# Patient Record
Sex: FEMALE | Race: WHITE
[De-identification: names, ages, dates, MRNs, and addresses within clinical notes are randomized per-mention and may not be internally consistent; named-entity substitution may affect disease eponyms.]

---

## 2017-02-21 ENCOUNTER — HOSPITAL ENCOUNTER (EMERGENCY)
Dept: HOSPITAL 74 - JER | Age: 82
Discharge: HOME | End: 2017-02-21
Payer: COMMERCIAL

## 2017-02-21 VITALS — BODY MASS INDEX: 30.1 KG/M2

## 2017-02-21 VITALS — HEART RATE: 75 BPM | SYSTOLIC BLOOD PRESSURE: 155 MMHG | DIASTOLIC BLOOD PRESSURE: 74 MMHG

## 2017-02-21 VITALS — TEMPERATURE: 97.8 F

## 2017-02-21 DIAGNOSIS — E78.00: ICD-10-CM

## 2017-02-21 DIAGNOSIS — J40: Primary | ICD-10-CM

## 2017-02-21 DIAGNOSIS — I10: ICD-10-CM

## 2017-02-21 DIAGNOSIS — K21.9: ICD-10-CM

## 2017-02-21 DIAGNOSIS — F41.9: ICD-10-CM

## 2017-02-21 LAB
ALBUMIN SERPL-MCNC: 3.9 G/DL (ref 3.4–5)
ALP SERPL-CCNC: 70 U/L (ref 45–117)
ALT SERPL-CCNC: 19 U/L (ref 12–78)
ANION GAP SERPL CALC-SCNC: 7 MMOL/L (ref 8–16)
AST SERPL-CCNC: 11 U/L (ref 15–37)
BASOPHILS # BLD: 0.4 % (ref 0–2)
BILIRUB SERPL-MCNC: 0.5 MG/DL (ref 0.2–1)
CALCIUM SERPL-MCNC: 9.6 MG/DL (ref 8.5–10.1)
CO2 SERPL-SCNC: 30 MMOL/L (ref 21–32)
CREAT SERPL-MCNC: 0.9 MG/DL (ref 0.55–1.02)
DEPRECATED RDW RBC AUTO: 14.5 % (ref 11.6–15.6)
EOSINOPHIL # BLD: 2.5 % (ref 0–4.5)
GLUCOSE SERPL-MCNC: 113 MG/DL (ref 74–106)
MCH RBC QN AUTO: 29 PG (ref 25.7–33.7)
MCHC RBC AUTO-ENTMCNC: 32.9 G/DL (ref 32–36)
MCV RBC: 88.1 FL (ref 80–96)
NEUTROPHILS # BLD: 81.6 % (ref 42.8–82.8)
PLATELET # BLD AUTO: 235 K/MM3 (ref 134–434)
PMV BLD: 8.2 FL (ref 7.5–11.1)
PROT SERPL-MCNC: 7.4 G/DL (ref 6.4–8.2)
TROPONIN I SERPL-MCNC: < 0.02 NG/ML (ref 0–0.05)
WBC # BLD AUTO: 11 K/MM3 (ref 4–10)

## 2017-02-21 NOTE — PDOC
History of Present Illness





- General


History Source: Patient, Old Records


Exam Limitations: No Limitations





- History of Present Illness


Initial Comments: 





02/21/17 10:15


The patient is a 84 year old female, with a significant past medical history of 

GERD, HTN, HLD, colon CA s/p removal of tumor and anxiety, who presents to the 

emergency department with shortness of breath since yesterday and a dry 

intermittent cough for a few weeks. She denies shortness of breath on exertion 

or orthopnea. She notes that she does have chest discomfort which she describes 

as a heaviness. She notes that she had cold symptoms last month. She denies any 

sick contacts or recent travel. She notes that she received a flu shot and a 

pneumonia shot this year. 





The patient denies headache and dizziness. Denies fever, chills, nausea, vomit, 

diarrhea and constipation. Denies dysuria, frequency, urgency and hematuria. 





Allergies: Milk, wheat, clams, egg whites, shrimp 


Past surgical history: TUMOR REMOVED FROM COLON


Social history: No alcohol, tobacco or drug use reported 


PMD - Dr. Renee Cardona 





<He Nathan - Last Filed: 02/21/17 11:15>





- General


History Source: Patient


Exam Limitations: No Limitations





<Karen Mcguire - Last Filed: 02/21/17 13:27>





- General


Chief Complaint: Shortness of Breath


Stated Complaint: COUGH, COLD


Time Seen by Provider: 02/21/17 10:03





Past History





<He Nathan - Last Filed: 02/21/17 11:15>





- Past Medical History


GI Disorders: Yes (gerd)


HTN: Yes


Hypercholesterolemia: Yes


Psychiatric Problems: Yes (anxeity)





- Surgical History


Abdominal Surgery: Yes (TUMOR REMOVED FROM COLON)





- Immunization History


Immunization Up to Date: Yes





- Psycho/Social/Smoking Cessation Hx


Anxiety: Yes


Suicidal Ideation: No


Smoking History: Never smoked


Have you smoked in the past 12 months: No


Information on smoking cessation initiated: No


Hx Alcohol Use: No


Drug/Substance Use Hx: No


Substance Use Type: Alcohol





<Karen Mcguire - Last Filed: 02/21/17 13:27>





- Past Medical History


Allergies/Adverse Reactions: 


 Allergies











Allergy/AdvReac Type Severity Reaction Status Date / Time


 


milk Allergy   Verified 02/21/17 09:47


 


wheat Allergy   Verified 02/21/17 09:47


 


CLAMS Allergy   Uncoded 02/21/17 09:47


 


EGG WHITES Allergy   Uncoded 02/21/17 09:47


 


SHRIMP Allergy   Uncoded 02/21/17 09:47











Home Medications: 


Ambulatory Orders





Amitriptyline HCl [Elavil -] 25 mg PO DAILY 01/20/14 


Atenolol [Tenormin -] 50 mg PO DAILY 01/20/14 


Losartan Potassium 50 mg PO DAILY 01/20/14 


Omeprazole [Prilosec (RX)] 20 mg PO DAILY 01/20/14 


Cholecalciferol (Vitamin D3) [Vitamin D] 1,000 unit PO DAILY 02/20/16 


Simvastatin [Zocor -] 20 mg PO HS 02/20/16 


Albuterol 0.083% Nebulizer Sol [Ventolin 0.083% Nebulizer Soln -] 1 neb NEB Q6H 

PRN #30 vial 02/21/17 


Azithromycin [Zithromax 250mg Tablets -] 250 mg PO UTDICT #6 tab 02/21/17 


Guaifenesin [Mucinex] 200 mg PO Q6H PRN #30 gran.pack 02/21/17 


Nebulizer/Compressor [Absecon Choice Nebulizer] 1 each MC QID PRN #1  02/21/17 


Nebulizer/Compressor [Comp-Air Elite Comp Nebulizer] 1 each MC ASDIR PRN #1 

each 02/21/17 











**Review of Systems





- Review of Systems


Able to Perform ROS?: Yes


Comments:: 





02/21/17 10:15


GENERAL/CONSTITUTIONAL: No fever or chills. No weakness.


HEAD, EYES, EARS, NOSE AND THROAT: No change in vision. No ear pain or 

discharge. No sore throat.


CARDIOVASCULAR: +Chest discomfort and shortness of breath


RESPIRATORY: No cough, wheezing, or hemoptysis.


GASTROINTESTINAL: No nausea, vomiting, diarrhea or constipation.


GENITOURINARY: No dysuria, frequency, or change in urination.


MUSCULOSKELETAL: No joint or muscle swelling or pain. No neck or back pain.


SKIN: No rash


NEUROLOGIC: No headache, vertigo, loss of consciousness, or change in strength/

sensation.


ENDOCRINE: No increased thirst. No abnormal weight change


HEMATOLOGIC/LYMPHATIC: No anemia, easy bleeding, or history of blood clots.


ALLERGIC/IMMUNOLOGIC: No hives or skin allergy.








<He Nathan Last Filed: 02/21/17 11:15>





*Physical Exam





- Vital Signs


 Last Vital Signs











Temp Pulse Resp BP Pulse Ox


 


 97.8 F   78   18   168/83   100 


 


 02/21/17 09:48  02/21/17 09:48  02/21/17 09:48  02/21/17 09:48  02/21/17 09:48














- Physical Exam


Comments: 





02/21/17 10:15


GENERAL: Awake, alert, and fully oriented, in no acute distress


HEAD: No signs of trauma, normocephalic, atraumatic 


EYES: PERRLA, EOMI, sclera anicteric, conjunctiva clear


ENT: Auricles normal inspection, hearing grossly normal, nares patent, 

oropharynx clear without


exudates. Moist mucosa


NECK: Normal ROM, supple, no lymphadenopathy, JVD, or masses


LUNGS: No distress, speaks full sentences, clear to auscultation bilaterally 


HEART: Regular rate and rhythm, normal S1 and S2, no murmurs, rubs or gallops, 

peripheral pulses normal and equal bilaterally. 


ABDOMEN: Soft, nontender, normoactive bowel sounds.  No guarding, no rebound.  

No masses


EXTREMITIES: Normal inspection, Normal range of motion, no edema.  No clubbing 

or cyanosis. 


NEUROLOGICAL: Cranial nerves II through XII grossly intact.  Normal speech, 

normal gait, no focal sensorimotor deficits 


SKIN: Warm, Dry, normal turgor, no rashes or lesions noted. 











<YoannajuanaHe - Last Filed: 02/21/17 11:15>





- Vital Signs


 Last Vital Signs











Temp Pulse Resp BP Pulse Ox


 


 97.8 F   78   18   168/83   100 


 


 02/21/17 09:48  02/21/17 09:48  02/21/17 09:48  02/21/17 09:48  02/21/17 09:48














<Karen Mcguire - Last Filed: 02/21/17 13:27>





ED Treatment Course





- LABORATORY


CBC & Chemistry Diagram: 


 02/21/17 10:39





 02/21/17 10:39





- RADIOLOGY


Radiograph Interpretation: 





02/21/17 11:15


Chest X-Ray


Reviewed by: Dr. Rios Honeycutt


Impression: No acute pathology. No significant change since 8/16/2011





<He Nathan - Last Filed: 02/21/17 11:15>





- LABORATORY


CBC & Chemistry Diagram: 


 02/21/17 10:39





 02/21/17 10:39





<Karen Mcguire - Last Filed: 02/21/17 13:27>





Medical Decision Making





- Medical Decision Making


02/21/17 10:07


A portion of this note was documented by scribe services under my direction. I 

have reviewed the details of the note, within reason, and agree with the 

documentation with the following case summary and management plan written by me.


Nursing documentation reviewed and incorporated into medical decision making





This is an 84-year-old female who is relatively healthy and presents emergency 

department with a complaint of upper respiratory infection.


She status post influenza and pneumococcal vaccinations.


Patient states she had an upper respiratory infection approximately 4 weeks ago.


On February 3 again she developed upper respiratory infection.


She has not followed up with her primary care physician.


She has not taken any antibiotics.


Patient states she again had these symptoms and was concerned because she has a 

cough, feels chest congestion


No fever.


No chills





02/21/17 11:51


 Laboratory Tests











  02/21/17 02/21/17





  10:39 10:39


 


WBC  11.0 H D 


 


Hgb  13.9 


 


Hct  42.2 


 


Plt Count  235 


 


Neutrophils %  81.6  D 


 


Lymphocytes %  11.2  D 


 


BUN   12  D


 


Creatinine   0.9  D











CXR negative 








02/21/17 13:13


 Laboratory Tests











  02/21/17





  10:39


 


Creatine Kinase  35


 


Troponin I  < 0.02











Patient seen in the ER by Dr. Cardona.


Plan Will be for discharge on azithromycin, Mucinex


Patient can also take albuterol if she finds is helpful.


Patient was asked to follow up with Dr. Cardona next week.


I've asked patient to take her temperature, return to the ER for any other 

concerns or complaints.








I discussed the physical exam findings, ancillary test results and final 

diagnoses with the patient. I answered all of the patient's questions. The 

patient was satisfied with the care received and felt comfortable with the 

discharge plan and treatment plan. The patient will call their primary care 

physician within 24 hours to arrange follow-up and will return to the Emergency 

Department with any new, persistent or worsening symptoms. 





<Karen Mcguire - Last Filed: 02/21/17 13:27>





*DC/Admit/Observation/Transfer





- Attestations


Scribe Attestion: 





02/21/17 10:15


Documentation prepared by He Nathan, acting as medical scribe for 

Karen Mcguire MD.





<He Nathan - Last Filed: 02/21/17 11:15>





- Discharge Dispostion


Admit: No





<Karen Mcguire - Last Filed: 02/21/17 13:27>


Diagnosis at time of Disposition: 


 Bronchitis





- Discharge Dispostion


Disposition: HOME


Condition at time of disposition: Improved





- Prescriptions


Prescriptions: 


Nebulizer/Compressor [Absecon Choice Nebulizer] 1 each MC QID PRN #1 


 PRN Reason: Wheezing


Nebulizer/Compressor [Comp-Air Elite Comp Nebulizer] 1 each MC ASDIR PRN #1 each


 PRN Reason: congestion


Guaifenesin [Mucinex] 200 mg PO Q6H PRN #30 gran.pack


 PRN Reason: cough and congestion


Albuterol 0.083% Nebulizer Sol [Ventolin 0.083% Nebulizer Soln -] 1 neb NEB Q6H 

PRN #30 vial


 PRN Reason: Wheezing


Azithromycin [Zithromax 250mg Tablets -] 250 mg PO UTDICT #6 tab





- Referrals


Referrals: 


Renee Arteaga MD [Primary Care Provider] - 





- Patient Instructions


Printed Discharge Instructions:  DI for Acute Bronchitis, DI for Viral Upper 

Respiratory Infection -- Adult


Additional Instructions: 


Danielle





Thank you for coming in to the ER 


Please monitor yourself for fevers and chills


If you are short of breath you must return to the ER


Please please remember to follow up with Dr Arteaga next week

## 2017-02-23 ENCOUNTER — HOSPITAL ENCOUNTER (EMERGENCY)
Dept: HOSPITAL 74 - JER | Age: 82
Discharge: HOME | End: 2017-02-23
Payer: COMMERCIAL

## 2017-02-23 VITALS — HEART RATE: 68 BPM | TEMPERATURE: 97.4 F

## 2017-02-23 VITALS — SYSTOLIC BLOOD PRESSURE: 159 MMHG | DIASTOLIC BLOOD PRESSURE: 73 MMHG

## 2017-02-23 VITALS — BODY MASS INDEX: 28.8 KG/M2

## 2017-02-23 DIAGNOSIS — F41.9: ICD-10-CM

## 2017-02-23 DIAGNOSIS — I10: ICD-10-CM

## 2017-02-23 DIAGNOSIS — N39.0: Primary | ICD-10-CM

## 2017-02-23 DIAGNOSIS — K21.9: ICD-10-CM

## 2017-02-23 DIAGNOSIS — Z85.038: ICD-10-CM

## 2017-02-23 DIAGNOSIS — E78.00: ICD-10-CM

## 2017-02-23 LAB
ALBUMIN SERPL-MCNC: 3.4 G/DL (ref 3.4–5)
ALP SERPL-CCNC: 62 U/L (ref 45–117)
ALT SERPL-CCNC: 17 U/L (ref 12–78)
ANION GAP SERPL CALC-SCNC: 10 MMOL/L (ref 8–16)
APPEARANCE UR: (no result)
AST SERPL-CCNC: 35 U/L (ref 15–37)
BACTERIA #/AREA URNS HPF: (no result) /HPF
BASOPHILS # BLD: 0.5 % (ref 0–2)
BILIRUB SERPL-MCNC: 0.5 MG/DL (ref 0.2–1)
BILIRUB UR STRIP.AUTO-MCNC: NEGATIVE MG/DL
CALCIUM SERPL-MCNC: 9 MG/DL (ref 8.5–10.1)
CO2 SERPL-SCNC: 27 MMOL/L (ref 21–32)
COLOR UR: YELLOW
CREAT SERPL-MCNC: 0.7 MG/DL (ref 0.55–1.02)
DEPRECATED RDW RBC AUTO: 14.3 % (ref 11.6–15.6)
EOSINOPHIL # BLD: 3.2 % (ref 0–4.5)
GLUCOSE SERPL-MCNC: 100 MG/DL (ref 74–106)
INR BLD: 1.04 (ref 0.82–1.09)
KETONES UR QL STRIP: NEGATIVE
LEUKOCYTE ESTERASE UR QL STRIP.AUTO: (no result)
MCH RBC QN AUTO: 29 PG (ref 25.7–33.7)
MCHC RBC AUTO-ENTMCNC: 33.2 G/DL (ref 32–36)
MCV RBC: 87.5 FL (ref 80–96)
NEUTROPHILS # BLD: 76 % (ref 42.8–82.8)
NITRITE UR QL STRIP: NEGATIVE
PH UR: 6 [PH] (ref 5–8)
PLATELET # BLD AUTO: 232 K/MM3 (ref 134–434)
PMV BLD: 8.8 FL (ref 7.5–11.1)
PROT SERPL-MCNC: 7 G/DL (ref 6.4–8.2)
PROT UR QL STRIP: NEGATIVE
PROT UR QL STRIP: NEGATIVE
PT PNL PPP: 11.5 SEC (ref 9.98–11.88)
RBC # BLD AUTO: 2 /HPF (ref 0–3)
RBC # UR STRIP: NEGATIVE /UL
SP GR UR: 1.01 (ref 1–1.03)
UROBILINOGEN UR STRIP-MCNC: NEGATIVE E.U./DL (ref 0.2–1)
WBC # BLD AUTO: 9.2 K/MM3 (ref 4–10)
WBC # UR AUTO: 61 /HPF (ref 3–5)

## 2017-02-23 PROCEDURE — 3E033GC INTRODUCTION OF OTHER THERAPEUTIC SUBSTANCE INTO PERIPHERAL VEIN, PERCUTANEOUS APPROACH: ICD-10-PCS

## 2017-02-23 NOTE — EKG
Test Reason : 

Blood Pressure : ***/*** mmHG

Vent. Rate : 064 BPM     Atrial Rate : 064 BPM

   P-R Int : 210 ms          QRS Dur : 086 ms

    QT Int : 410 ms       P-R-T Axes : 016 -11 -01 degrees

   QTc Int : 422 ms

 

SINUS RHYTHM WITH 1ST DEGREE A-V BLOCK

POSSIBLE ANTERIOR INFARCT (CITED ON OR BEFORE 30-AUG-2011)

ABNORMAL ECG

WHEN COMPARED WITH ECG OF 30-AUG-2011 14:08,

NO SIGNIFICANT CHANGE WAS FOUND

Confirmed by JAYE ADAM MD ([id number]) on 2/23/2017 2:45:08 PM

 

Referred By:             Confirmed By:JAYE ADAM MD

## 2017-02-23 NOTE — PDOC
History of Present Illness





<Federico Hall - Last Filed: 02/23/17 16:31>





- History of Present Illness


Initial Comments: 


02/23/17 16:35


Patient is an 84 year old female with significant medical hx of GERD, HTN, HLD, 

colon CA s/p removal of tumor and anxiety who is presenting to the ED with 

diarrhea and lightheaded since yesterday. Patient was seen in the ED on 02/21/ 17 for cough and discharged on azithromycin and mucinex. Since starting 

antibiotic treatment that day, the patient has had multiple episodes of 

diarrhea and one episode of vomiting. The patient is associating her symptoms 

with her antibiotic treatment; she states that shes never taken azithromycin 

before. 


Patient notes that her cough has been improving since starting medication.


Denies fever, chills, abdominal pain, blood in stool. 





Allergies: Milk, wheat, clams, egg whites, shrimp 


Surgical Hx: colon tumor removal


Social Hx: Denies alcohol, tobacco or drug use 


PMD: Renee Arteaga MD








<Anastasia Jacobsen - Last Filed: 02/23/17 16:39>





- General


Chief Complaint: Lightheaded


Stated Complaint: DIZZINESS


Time Seen by Provider: 02/23/17 13:02





Past History





- Past Medical History


GI Disorders: Yes (gerd)


HTN: Yes


Hypercholesterolemia: Yes


Psychiatric Problems: Yes (anxeity)





- Surgical History


Abdominal Surgery: Yes (TUMOR REMOVED FROM COLON)





- Immunization History


Immunization Up to Date: Yes





- Psycho/Social/Smoking Cessation Hx


Anxiety: Yes


Suicidal Ideation: No


Smoking History: Never smoked


Have you smoked in the past 12 months: No


Hx Alcohol Use: No


Drug/Substance Use Hx: No


Substance Use Type: Alcohol





<Federico Hall - Last Filed: 02/23/17 16:31>





<Anastasia Jacobsen - Last Filed: 02/23/17 16:39>





- Past Medical History


Allergies/Adverse Reactions: 


 Allergies











Allergy/AdvReac Type Severity Reaction Status Date / Time


 


milk Allergy   Verified 02/23/17 13:06


 


wheat Allergy   Verified 02/23/17 13:06


 


CLAMS Allergy   Uncoded 02/23/17 13:06


 


EGG WHITES Allergy   Uncoded 02/23/17 13:06


 


SHRIMP Allergy   Uncoded 02/23/17 13:06











Home Medications: 


Ambulatory Orders





Amitriptyline HCl [Elavil -] 25 mg PO DAILY 01/20/14 


Atenolol [Tenormin -] 50 mg PO DAILY 01/20/14 


Losartan Potassium 50 mg PO DAILY 01/20/14 


Omeprazole [Prilosec (RX)] 20 mg PO DAILY 01/20/14 


Cholecalciferol (Vitamin D3) [Vitamin D] 1,000 unit PO DAILY 02/20/16 


Simvastatin [Zocor -] 20 mg PO HS 02/20/16 


Albuterol 0.083% Nebulizer Sol [Ventolin 0.083% Nebulizer Soln -] 1 neb NEB Q6H 

PRN #30 vial 02/21/17 


Azithromycin [Zithromax 250mg Tablets -] 250 mg PO UTDICT #6 tab 02/21/17 


Guaifenesin [Mucinex] 200 mg PO Q6H PRN #30 gran.pack 02/21/17 


Nebulizer/Compressor [Comp-Air Elite Comp Nebulizer] 1 each MC ASDIR PRN #1 

each 02/21/17 


Cephalexin Monohydrate [Keflex -] 500 mg PO Q6H #40 capsule 02/23/17 


Lactobacillus Rhamnosus GG [Culturelle] 1 each PO BID #60 capsule 02/23/17 











**Review of Systems





- Review of Systems


Comments:: 


02/23/17 16:36


GENERAL/CONSTITUTIONAL: No fever or chills. No weakness.


HEAD, EYES, EARS, NOSE AND THROAT: No change in vision. No ear pain or 

discharge. No sore throat.


CARDIOVASCULAR: Lightheadedness. No chest pain or shortness of breath.


RESPIRATORY: Cough. No wheezing or hemoptysis.


GASTROINTESTINAL: Vomiting, diarrhea. No abdominal pain, diarrhea or 

constipation.


GENITOURINARY: No dysuria, frequency, or change in urination.


MUSCULOSKELETAL: No joint or muscle swelling or pain. No neck or back pain.


SKIN: No rash


NEUROLOGIC: No headache, vertigo, loss of consciousness, or change in strength/

sensation.








<Anastasia Jacobsen - Last Filed: 02/23/17 16:39>





*Physical Exam





- Vital Signs


 Last Vital Signs











Temp Pulse Resp BP Pulse Ox


 


 97.4 F L  68   20   159/73   99 


 


 02/23/17 13:07  02/23/17 14:31  02/23/17 13:07  02/23/17 14:31  02/23/17 13:07














- Physical Exam


Comments: 


02/23/17 16:37


GENERAL: Awake, alert, and fully oriented, in no acute distress


HEAD: No signs of trauma


EYES: PERRLA, EOMI, sclera anicteric, conjunctiva clear


ENT: Auricles normal inspection, hearing grossly normal, nares patent, 

oropharynx clear without 


exudates. Moist mucosa


NECK: Normal ROM, supple, no lymphadenopathy, JVD, or masses


LUNGS: Breath sounds equal, clear to auscultation bilaterally.  No wheezes, and 

no crackles


HEART: Regular rate and rhythm, normal S1 and S2, no murmurs, rubs or gallops


ABDOMEN: Soft, nontender, normoactive bowel sounds.  No guarding, no rebound.  

No masses


EXTREMITIES: Normal range of motion, no edema.  No clubbing or cyanosis. No 

cords, erythema, or tenderness


NEUROLOGICAL: Cranial nerves II through XII grossly intact.  Normal speech, 

normal gait


SKIN: Warm, Dry, normal turgor, no rashes or lesions noted.


ENDOCRINE: No increased thirst. No abnormal weight change.


HEMATOLOGIC/LYMPHATIC: No anemia, easy bleeding, or history of blood clots.


ALLERGIC/IMMUNOLOGIC: No hives or skin allergy.








<Anastasia Jacobsen - Last Filed: 02/23/17 16:39>





ED Treatment Course





- LABORATORY


CBC & Chemistry Diagram: 


 02/23/17 13:42





 02/23/17 13:42





<Federico Hall - Last Filed: 02/23/17 16:31>





- LABORATORY


CBC & Chemistry Diagram: 


 02/23/17 13:42





 02/23/17 13:42





- ADDITIONAL ORDERS


Additional order review: 


 Laboratory  Results











  02/23/17 02/23/17 02/23/17





  13:42 13:42 13:42


 


INR    1.04


 


Sodium  136  


 


Potassium  4.5  


 


Chloride  99  


 


Carbon Dioxide  27  


 


Anion Gap  10  


 


BUN  16  D  


 


Creatinine  0.7  D  


 


Creat Clearance w eGFR  > 60  


 


Random Glucose  100  


 


Calcium  9.0  


 


Total Bilirubin  0.5  


 


AST  35  D  


 


ALT  17  


 


Alkaline Phosphatase  62  


 


Total Protein  7.0  


 


Albumin  3.4  


 


Lipase  85  


 


Urine Color   Yellow 


 


Urine Appearance   Cloudy 


 


Urine pH   6.0 


 


Ur Specific Gravity   1.008 


 


Urine Protein   Negative 


 


Urine Glucose (UA)   Negative 


 


Urine Ketones   Negative 


 


Urine Blood   Negative 


 


Urine Nitrite   Negative 


 


Urine Bilirubin   Negative 


 


Urine Urobilinogen   Negative 


 


Ur Leukocyte Esterase   3+ H D 


 


Urine RBC   2 


 


Urine WBC   61 


 


Ur Epithelial Cells   Rare 


 


Urine Bacteria   Many 




















 02/23/17 14:30 Clostridium difficile Antigen (FELIPE) - Final





 Stool Clostridium difficile Toxin Assay - Final








 











  02/23/17





  13:42


 


RBC  4.60


 


MCV  87.5


 


MCHC  33.2


 


RDW  14.3


 


MPV  8.8


 


Neutrophils %  76.0


 


Lymphocytes %  14.7  D


 


Monocytes %  5.6


 


Eosinophils %  3.2


 


Basophils %  0.5














- RADIOLOGY


Radiograph Interpretation: 


02/23/17 16:38


Head CT


Impression: Moderate atrophy. 


No gross evidence of a focal intracranial lesion or hemorrhage is seen.


Reported By: Lucas Linda MD





- Medications


Given in the ED: 


ED Medications














Discontinued Medications














Generic Name Dose Route Start Last Admin





  Trade Name Freq  PRN Reason Stop Dose Admin


 


Sodium Chloride  1,000 mls @ 1,000 mls/hr 02/23/17 13:38 02/23/17 14:10





  Normal Saline -  IV 02/23/17 14:37  1,000 mls/hr





  ASDIR STA   Administration


 


Ondansetron HCl  4 mg 02/23/17 13:38 02/23/17 14:10





  Zofran Injection  IVPUSH 02/23/17 13:39  4 mg





  ONCE ONE   Administration














<Anastasia Jacobsen - Last Filed: 02/23/17 16:39>





*DC/Admit/Observation/Transfer





- Discharge Dispostion


Admit: No





- Attestations


Physician Attestion: 





02/23/17 13:03








I, Dr. Federico Hall, attest that this document has been prepared under my 

direction and personally reviewed by me in its entirety.   I further attest, 

that it accurately reflects all work, treatment, procedures and medical decision

-making performed by me.  





<Federico Hall - Last Filed: 02/23/17 16:31>





- Attestations


Scribe Attestion: 


02/23/17 16:39


Documentation prepared by Anastasia Jacobsen, acting as medical scribe for Federico Hall MD.





<Anastasia Jacobsen - Last Filed: 02/23/17 16:39>


Diagnosis at time of Disposition: 


UTI (urinary tract infection)


Qualifiers:


 Urinary tract infection type: site unspecified Hematuria presence: without 

hematuria Qualified Code(s): N39.0 - Urinary tract infection, site not specified





- Referrals


Referrals: 


Renee Arteaga MD [Primary Care Provider] - 





- Patient Instructions


Printed Discharge Instructions:  DI for Urinary Tract Infection (UTI)


Additional Instructions: 


Danielle........





Sorry that you have diarrhea.  Please stop the Zithromax you got here the other 

day.  It looks like you have a urinary tract infection so we are going to put 

you on another antibiotic.  It is important that you eat lots of yogurt while 

you are taking the antibiotic..... that will keep you from having diarrhea.  

Follow up with your doctor next week.  Return to us if problems.





Best-


Dr. Federico Hall

## 2018-10-15 ENCOUNTER — HOSPITAL ENCOUNTER (EMERGENCY)
Dept: HOSPITAL 74 - JER | Age: 83
Discharge: HOME | End: 2018-10-15
Payer: COMMERCIAL

## 2018-10-15 VITALS — BODY MASS INDEX: 28.3 KG/M2

## 2018-10-15 VITALS — DIASTOLIC BLOOD PRESSURE: 79 MMHG | HEART RATE: 65 BPM | SYSTOLIC BLOOD PRESSURE: 150 MMHG

## 2018-10-15 VITALS — TEMPERATURE: 98.1 F

## 2018-10-15 DIAGNOSIS — R21: Primary | ICD-10-CM

## 2018-10-15 DIAGNOSIS — I10: ICD-10-CM

## 2018-10-15 DIAGNOSIS — K21.9: ICD-10-CM

## 2018-10-15 DIAGNOSIS — R41.9: ICD-10-CM

## 2018-10-15 DIAGNOSIS — E78.00: ICD-10-CM

## 2018-10-15 DIAGNOSIS — Z85.038: ICD-10-CM

## 2018-10-15 LAB
ALBUMIN SERPL-MCNC: 3.5 G/DL (ref 3.4–5)
ALP SERPL-CCNC: 73 U/L (ref 45–117)
ALT SERPL-CCNC: 17 U/L (ref 13–61)
ANION GAP SERPL CALC-SCNC: 2 MMOL/L (ref 8–16)
AST SERPL-CCNC: 12 U/L (ref 15–37)
BASOPHILS # BLD: 0.4 % (ref 0–2)
BILIRUB SERPL-MCNC: 0.5 MG/DL (ref 0.2–1)
BUN SERPL-MCNC: 14 MG/DL (ref 7–18)
CALCIUM SERPL-MCNC: 10.2 MG/DL (ref 8.5–10.1)
CHLORIDE SERPL-SCNC: 99 MMOL/L (ref 98–107)
CO2 SERPL-SCNC: 31 MMOL/L (ref 21–32)
CREAT SERPL-MCNC: 0.7 MG/DL (ref 0.55–1.3)
DEPRECATED RDW RBC AUTO: 14.7 % (ref 11.6–15.6)
EOSINOPHIL # BLD: 1.5 % (ref 0–4.5)
GLUCOSE SERPL-MCNC: 102 MG/DL (ref 74–106)
HCT VFR BLD CALC: 41.4 % (ref 32.4–45.2)
HGB BLD-MCNC: 13.9 GM/DL (ref 10.7–15.3)
LYMPHOCYTES # BLD: 18.5 % (ref 8–40)
MCH RBC QN AUTO: 29.5 PG (ref 25.7–33.7)
MCHC RBC AUTO-ENTMCNC: 33.5 G/DL (ref 32–36)
MCV RBC: 87.8 FL (ref 80–96)
MONOCYTES # BLD AUTO: 10.9 % (ref 3.8–10.2)
NEUTROPHILS # BLD: 68.7 % (ref 42.8–82.8)
PLATELET # BLD AUTO: 247 K/MM3 (ref 134–434)
PMV BLD: 7.8 FL (ref 7.5–11.1)
POTASSIUM SERPLBLD-SCNC: 4.3 MMOL/L (ref 3.5–5.1)
PROT SERPL-MCNC: 7 G/DL (ref 6.4–8.2)
RBC # BLD AUTO: 4.72 M/MM3 (ref 3.6–5.2)
SODIUM SERPL-SCNC: 131 MMOL/L (ref 136–145)
WBC # BLD AUTO: 5.9 K/MM3 (ref 4–10)

## 2018-10-15 NOTE — PDOC
History of Present Illness





- General


Chief Complaint: Lightheaded


Stated Complaint: PCP SENT


Time Seen by Provider: 10/15/18 11:59





- History of Present Illness


Initial Comments: 


85 year old female with PMHx of GERD, HTN, HLD, vertigo (medication refractory)

, colon CA s/p removal of tumor and anxiety presenting with a few episodes she 

states are consistent with her vertigo and painful redness over her left ear 

and the back of her head. She discussed her symptoms with Dr. Renee Cardona 

this morning and she was advised to come to the ED. SHe has many allergies and 

denies any exposure to new 


10/15/18 12:00





Past History





- Past Medical History


Allergies/Adverse Reactions: 


 Allergies











Allergy/AdvReac Type Severity Reaction Status Date / Time


 


milk Allergy   Verified 10/15/18 11:12


 


wheat Allergy   Verified 10/15/18 11:12


 


CLAMS Allergy   Uncoded 10/15/18 11:12


 


EGG WHITES Allergy   Uncoded 10/15/18 11:12


 


SHRIMP Allergy   Uncoded 10/15/18 11:12











Home Medications: 


Ambulatory Orders





Amitriptyline HCl [Elavil -] 25 mg PO DAILY 01/20/14 


Atenolol [Tenormin -] 50 mg PO DAILY 01/20/14 


Losartan Potassium 50 mg PO DAILY 01/20/14 


Omeprazole [Prilosec (RX)] 20 mg PO DAILY 01/20/14 


Cholecalciferol (Vitamin D3) [Vitamin D] 1,000 unit PO DAILY 02/20/16 


Simvastatin [Zocor -] 20 mg PO HS 02/20/16 


Albuterol 0.083% Nebulizer Sol [Ventolin 0.083% Nebulizer Soln -] 1 neb NEB Q6H 

PRN #30 vial 02/21/17 


Azithromycin [Zithromax 250mg Tablets -] 250 mg PO UTDICT #6 tab 02/21/17 


Guaifenesin [Mucinex] 200 mg PO Q6H PRN #30 gran.pack 02/21/17 


Nebulizer and Compressor [Comp-Air Elite Comp Nebulizer] 1 each MC ASDIR PRN #1 

each 02/21/17 


Cephalexin Monohydrate [Keflex -] 500 mg PO Q6H #40 capsule 02/23/17 


Lactobacillus Rhamnosus GG [Culturelle] 1 each PO BID #60 capsule 02/23/17 








COPD: No


GI Disorders: Yes (gerd)


HTN: Yes


Hypercholesterolemia: Yes


Psychiatric Problems: Yes (anxeity)


Other medical history: VERTIGO





- Surgical History


Abdominal Surgery: Yes (TUMOR REMOVED FROM COLON)





- Immunization History


Immunization Up to Date: Yes





- Suicide/Smoking/Psychosocial Hx


Smoking History: Never smoked


Have you smoked in the past 12 months: No


Information on smoking cessation initiated: No


Hx Alcohol Use: No


Drug/Substance Use Hx: No


Substance Use Type: Alcohol





**Review of Systems





- Review of Systems


Constitutional: No: Chills, Fever


HEENTM: No: Eye Pain, Blurred Vision, Recent change in vision


Respiratory: No: Cough, Orthopnea


Cardiac (ROS): No: Chest Pain, Edema, Lightheadedness, Palpitations


ABD/GI: No: Diarrhea, Nausea, Vomiting


: No: Burning, Dysuria, Discharge, Frequency


Musculoskeletal: No: Back Pain, Joint Pain


Integumentary: Yes: Bruising, Lesions, Pruritus, Rash


Neurological: Yes: Unsteady Gait, Dizziness.  No: Headache, Numbness, Tingling


Psychiatric: No: Anxiety, Depression


Hematologic/Lymphatic: No: Anemia, Blood Clots, Easy Bleeding





*Physical Exam





- Vital Signs


 Last Vital Signs











Temp Pulse Resp BP Pulse Ox


 


 98.1 F   76   18   122/79   100 


 


 10/15/18 11:12  10/15/18 11:12  10/15/18 11:12  10/15/18 11:12  10/15/18 11:12














- Physical Exam


General Appearance: Yes: Nourished, Appropriately Dressed.  No: Apparent 

Distress


HEENT: positive: EOMI, PABLO, Normal ENT Inspection, Normal Voice


Neck: positive: Trachea midline, Normal Thyroid, Supple.  negative: Tender, 

Rigid


Respiratory/Chest: positive: Lungs Clear, Normal Breath Sounds.  negative: 

Chest Tender, Respiratory Distress, Accessory Muscle Use


Cardiovascular: positive: Regular Rhythm, Regular Rate


Gastrointestinal/Abdominal: positive: Normal Bowel Sounds, Flat, Soft.  negative

: Tender


Lymphatic: negative: Adenopathy, Tenderness


Musculoskeletal: positive: Normal Inspection.  negative: Decreased Range of 

Motion


Extremity: positive: Normal Capillary Refill, Normal Inspection, Normal Range 

of Motion.  negative: Tender


Integumentary: positive: Normal Color, Dry, Warm, Rash (Slight swelling and 

warmth over left ear without concern for active infection. symmetric bilateral 

facial erythema on upper face. Small pruritic circular lesions on base of scalp 

that are not raised, inudrateed, or concernign for infection)


Neurologic: positive: Fully Oriented, Alert, Normal Mood/Affect, Normal Response

, Motor Strength 5/5





ED Treatment Course





- LABORATORY


CBC & Chemistry Diagram: 


 10/15/18 12:41





 10/15/18 12:41





Medical Decision Making





- Medical Decision Making


85 year old female with PMH of many allergies and vertio presenting with a few 

episodes of falling sensation without syncope or actual fall consistent with 

her previous history of vertigo. Also complains of a pruritic rash on her left 

ear and base of her scalp. Patient are her baseline level of ambulation without 

active symptoms and rash does not appear infect. VSS. All labs returned WNL and 

rash slightly improved after Benadryl administration. Patient DC'd home with 

follow up instructions with her PCP/ neurologist to better control her vertigo (

although she admits medication has not helped her in the past) and return 

precautions as well as coaching on using her walker more frequently. 








*DC/Admit/Observation/Transfer


Diagnosis at time of Disposition: 


 Rash and nonspecific skin eruption








- Discharge Dispostion


Disposition: HOME


Condition at time of disposition: Improved


Decision to Admit order: No





- Referrals


Referrals: 


Renee Arteaga MD [Primary Care Provider] - 





- Patient Instructions


Printed Discharge Instructions:  DI for Rash


Additional Instructions: 


This may be an allergic reaction or it may be something else that requires 

evaluation by a dermatologist. Please follow up with Dr. Brendan watkins  

dermatology referral if needed. Please use Benadryl for the itching. Please do 

not drive if you use Benadryl. You can use Tylenol for the pain. Please return 

to the ED if you have any other new or worsening symptoms.





- Post Discharge Activity

## 2018-10-15 NOTE — PDOC
Attending Attestation





- Resident


Resident Name: Renita Michelle





- ED Attending Attestation


I have performed the following: I have examined & evaluated the patient, The 

case was reviewed & discussed with the resident, I agree w/resident's findings 

& plan, Exceptions are as noted





- HPI


HPI: 





10/15/18 13:44


The patient is a 85 year old female, with a significant past medical history of 

vertigo, GERD, HTN, HLD, colon CA (s/p removal of tumor), and anxiety , who 

presents to the emergency department with, a rash and multiple of dizziness. 

Patient describes her rash as erythematous, itchy, and localized to the left ear

, base of the neck, face, and left ear. The patient also endorses 3 episodes of 

dizziness where she felt like she would fall. She notes her symptoms are 

similar to her episodes of vertigo but, more frequent.





She denies recent fevers, chills, or headache. She denies recent nausea, vomit, 

diarrhea or constipation. She denies recent  dysuria, frequency, urgency or 

hematuria. She denies recent chest pain or shortness of breath.





Allergies: Milk, wheat, clams, egg whites, shrimp 


Past surgical history: colon tumor removal.


Social history: Nonsmoker. Denies EtOH use and recreational drug use. 


Primary Care Physician: Dr. Renee Cardona








Attestations





- Attestations





10/15/18 13:45





Documentation prepared by Ramírez Esteves, acting as medical scribe for 

Loida Schulte MD.

## 2018-10-16 NOTE — EKG
Test Reason : 

Blood Pressure : ***/*** mmHG

Vent. Rate : 065 BPM     Atrial Rate : 065 BPM

   P-R Int : 252 ms          QRS Dur : 086 ms

    QT Int : 392 ms       P-R-T Axes : 067 -10 002 degrees

   QTc Int : 407 ms

 

SINUS RHYTHM WITH 1ST DEGREE A-V BLOCK

LOW VOLTAGE QRS

ABNORMAL ECG

WHEN COMPARED WITH ECG OF 23-FEB-2017 14:32,

NO SIGNIFICANT CHANGE WAS FOUND

Confirmed by MD RAUL, WALTER ([id number]) on 10/16/2018 1:05:12 PM

 

Referred By:             Confirmed By:WALTER CARTER MD

## 2018-12-14 ENCOUNTER — HOSPITAL ENCOUNTER (EMERGENCY)
Dept: HOSPITAL 74 - JER | Age: 83
Discharge: HOME | End: 2018-12-14
Payer: COMMERCIAL

## 2018-12-14 VITALS — SYSTOLIC BLOOD PRESSURE: 158 MMHG | DIASTOLIC BLOOD PRESSURE: 72 MMHG | HEART RATE: 62 BPM

## 2018-12-14 VITALS — BODY MASS INDEX: 28.3 KG/M2

## 2018-12-14 VITALS — TEMPERATURE: 97.4 F

## 2018-12-14 DIAGNOSIS — N39.0: ICD-10-CM

## 2018-12-14 DIAGNOSIS — J40: Primary | ICD-10-CM

## 2018-12-14 LAB
ALBUMIN SERPL-MCNC: 3.4 G/DL (ref 3.4–5)
ALP SERPL-CCNC: 71 U/L (ref 45–117)
ALT SERPL-CCNC: 21 U/L (ref 13–61)
ANION GAP SERPL CALC-SCNC: 8 MMOL/L (ref 8–16)
APPEARANCE UR: CLEAR
AST SERPL-CCNC: 21 U/L (ref 15–37)
BACTERIA #/AREA URNS HPF: (no result) /HPF
BASOPHILS # BLD: 0.7 % (ref 0–2)
BILIRUB SERPL-MCNC: 0.3 MG/DL (ref 0.2–1)
BILIRUB UR STRIP.AUTO-MCNC: NEGATIVE MG/DL
BUN SERPL-MCNC: 15 MG/DL (ref 7–18)
CALCIUM SERPL-MCNC: 9.4 MG/DL (ref 8.5–10.1)
CHLORIDE SERPL-SCNC: 97 MMOL/L (ref 98–107)
CO2 SERPL-SCNC: 27 MMOL/L (ref 21–32)
COLOR UR: (no result)
CREAT SERPL-MCNC: 0.8 MG/DL (ref 0.55–1.3)
DEPRECATED RDW RBC AUTO: 15 % (ref 11.6–15.6)
EOSINOPHIL # BLD: 3.1 % (ref 0–4.5)
GLUCOSE SERPL-MCNC: 96 MG/DL (ref 74–106)
HCT VFR BLD CALC: 40.4 % (ref 32.4–45.2)
HGB BLD-MCNC: 14.3 GM/DL (ref 10.7–15.3)
KETONES UR QL STRIP: NEGATIVE
LEUKOCYTE ESTERASE UR QL STRIP.AUTO: (no result)
LYMPHOCYTES # BLD: 20.3 % (ref 8–40)
MCH RBC QN AUTO: 30.3 PG (ref 25.7–33.7)
MCHC RBC AUTO-ENTMCNC: 35.3 G/DL (ref 32–36)
MCV RBC: 85.8 FL (ref 80–96)
MONOCYTES # BLD AUTO: 5.1 % (ref 3.8–10.2)
NEUTROPHILS # BLD: 70.8 % (ref 42.8–82.8)
NITRITE UR QL STRIP: NEGATIVE
PH UR: 6 [PH] (ref 5–8)
PLATELET # BLD AUTO: 267 K/MM3 (ref 134–434)
PMV BLD: 7.8 FL (ref 7.5–11.1)
POTASSIUM SERPLBLD-SCNC: 4.3 MMOL/L (ref 3.5–5.1)
PROT SERPL-MCNC: 6.8 G/DL (ref 6.4–8.2)
PROT UR QL STRIP: NEGATIVE
PROT UR QL STRIP: NEGATIVE
RBC # BLD AUTO: 4.71 M/MM3 (ref 3.6–5.2)
SODIUM SERPL-SCNC: 132 MMOL/L (ref 136–145)
SP GR UR: 1 (ref 1.01–1.03)
UROBILINOGEN UR STRIP-MCNC: NEGATIVE MG/DL (ref 0.2–1)
WBC # BLD AUTO: 6.8 K/MM3 (ref 4–10)

## 2018-12-14 NOTE — EKG
Test Reason : 

Blood Pressure : ***/*** mmHG

Vent. Rate : 062 BPM     Atrial Rate : 062 BPM

   P-R Int : 226 ms          QRS Dur : 078 ms

    QT Int : 404 ms       P-R-T Axes : -20 -17 -10 degrees

   QTc Int : 410 ms

 

SINUS RHYTHM WITH 1ST DEGREE A-V BLOCK

MINIMAL VOLTAGE CRITERIA FOR LVH, MAY BE NORMAL VARIANT

INFERIOR INFARCT , AGE UNDETERMINED

ABNORMAL ECG

WHEN COMPARED WITH ECG OF 15-OCT-2018 12:42,

NO SIGNIFICANT CHANGE WAS FOUND

Confirmed by GREGORIA MONDRAGON MD ([id number]) on 12/14/2018 1:33:27 PM

 

Referred By:             Confirmed By:GREGORIA MONDRAGON MD

## 2018-12-14 NOTE — PDOC
History of Present Illness





- General


Chief Complaint: Lightheaded


Stated Complaint: DIZZINESS


Time Seen by Provider: 12/14/18 11:00


History Source: Patient


Exam Limitations: No Limitations





- History of Present Illness


Initial Comments: 





12/14/18 11:46


The patient is an 86F with a PMH of GERD, HTN, HLD, vertigo (medication 

refractory), colon CA s/p removal of tumor and anxiety who presents to the ER 

with complaints of cough. The patient states that she's had a cough x 2 weeks. 

She states that during these two weeks, she had woken up one night with night 

sweats. She denies SOB, lightheadedness, CP, current fever, chills, nausea, 

vomiting, dysuria. 





Past History





- Past Medical History


Allergies/Adverse Reactions: 


 Allergies











Allergy/AdvReac Type Severity Reaction Status Date / Time


 


clams Allergy   Verified 12/14/18 13:10


 


corn Allergy   Verified 12/14/18 13:12


 


milk Allergy   Verified 12/14/18 10:38


 


peanut Allergy   Verified 12/14/18 13:12


 


soybean Allergy   Verified 12/14/18 13:12


 


walnut Allergy   Verified 12/14/18 13:12


 


wheat Allergy   Verified 12/14/18 10:38


 


CLAMS Allergy   Uncoded 12/14/18 10:38


 


EGG WHITES Allergy   Uncoded 12/14/18 10:38


 


SHRIMP Allergy   Uncoded 12/14/18 10:38











Home Medications: 


Ambulatory Orders





Amitriptyline HCl [Elavil -] 25 mg PO DAILY 01/20/14 


Atenolol [Tenormin -] 50 mg PO DAILY 01/20/14 


Losartan Potassium 50 mg PO DAILY 01/20/14 


Cholecalciferol (Vitamin D3) [Vitamin D] 1,000 unit PO DAILY 02/20/16 


Simvastatin [Zocor -] 20 mg PO HS 02/20/16 


Hydrochlorothiazide [Hctz -] 12.5 mg PO DAILY 12/14/18 


Meloxicam 7.5 mg PO DAILY 12/14/18 


Omeprazole 20 mg PO DAILY 12/14/18 


Polyethylene Glycol 3350 [Purelax] 17 gm PO DAILY 12/14/18 


Sulfamethoxazole/Trimethoprim [Bactrim Ds -] 1 tab PO BID #14 tablet 12/14/18 








COPD: No


CHF: No


GI Disorders: Yes (gerd)


HTN: Yes


Hypercholesterolemia: Yes


Psychiatric Problems: Yes (anxeity)





- Surgical History


Abdominal Surgery: Yes (TUMOR REMOVED FROM COLON)





- Immunization History


Immunization Up to Date: Yes





- Suicide/Smoking/Psychosocial Hx


Smoking History: Never smoked


Have you smoked in the past 12 months: No


Information on smoking cessation initiated: No


Hx Alcohol Use: No


Drug/Substance Use Hx: No


Substance Use Type: Alcohol





**Review of Systems





- Review of Systems


Able to Perform ROS?: Yes


Comments:: 





12/14/18 13:34


GENERAL/CONSTITUTIONAL: Positive for resolved night sweats. No fever or chills. 

No weakness.


HEAD, EYES, EARS, NOSE AND THROAT: No change in vision. No ear pain or 

discharge. No sore throat.


CARDIOVASCULAR: No chest pain, palpitations, or lightheadedness.


RESPIRATORY: Positive for cough. No wheezing, shortness of breath, or 

hemoptysis.


GASTROINTESTINAL: No nausea, vomiting, diarrhea, constipation, or abdominal 

pain. 


GENITOURINARY: No dysuria, frequency, hematuria, or change in urination.


MUSCULOSKELETAL: No joint or muscle swelling or pain. No neck or back pain.


SKIN: No rash or lesions. 


NEUROLOGIC: No headache, numbness, tingling, focal weakness, loss of 

consciousness, or change in strength/sensation.





Is the patient limited English proficient: No





*Physical Exam





- Vital Signs


 Last Vital Signs











Temp Pulse Resp BP Pulse Ox


 


 97.4 F L  72   16   166/68   100 


 


 12/14/18 10:35  12/14/18 10:35  12/14/18 10:35  12/14/18 10:35  12/14/18 10:35














- Physical Exam


Comments: 





12/14/18 13:35


GENERAL: Well developed, well nourished. Awake and alert. No acute distress.


HEENT: Normocephalic, atraumatic. Hearing grossly normal. Moist mucous 

membranes. PERRLA, EOMI. No conjunctival pallor. Sclera are non-icteric. 


NECK: Supple. Full ROM. No JVD. 


CARDIOVASCULAR: Regular rate and rhythm. No murmurs, rubs, or gallops. 


PULMONARY: No evidence of respiratory distress. Fine crackles noted in R lower 

lobe.


ABDOMINAL: Soft. Non-tender. Non-distended. No rebound or guarding. No 

organomegaly. Normoactive bowel sounds. 


GENITOURINARY: No CVA tenderness bilaterally.


MUSCULOSKELETAL: Normal range of motion at all joints. No bony deformities or 

tenderness. 


EXTREMITIES: No cyanosis. No clubbing. 1+ pitting edema in b/l LE. No calf 

tenderness or swelling.


SKIN: Warm and dry. Normal capillary refill. No rashes. No jaundice. 


NEUROLOGICAL: Alert, awake, appropriate. Cranial nerves 2-12 grossly intact. 

Normal speech. Gait is normal without ataxia.


PSYCHIATRIC: Cooperative. Good eye contact. Appropriate mood and affect.








Moderate Sedation





- Procedure Monitoring


Vital Signs: 


Procedure Monitoring Vital Signs











Temperature  97.4 F L  12/14/18 10:35


 


Pulse Rate  72   12/14/18 10:35


 


Respiratory Rate  16   12/14/18 10:35


 


Blood Pressure  166/68   12/14/18 10:35


 


O2 Sat by Pulse Oximetry (%)  100   12/14/18 10:35











ED Treatment Course





- LABORATORY


CBC & Chemistry Diagram: 


 12/14/18 11:51





 12/14/18 11:51





- RADIOLOGY


Radiology Studies Ordered: 














 Category Date Time Status


 


 CHEST X-RAY PORTABLE* [RAD] Stat Radiology  12/14/18 11:14 Taken














Medical Decision Making





- Medical Decision Making





12/14/18 13:36


The patient is an 86F with MMP who presents to the ER with complaints of cough. 

PE significant for fine crackles in RLL, concerning for PNA. However, pt is 

afebrile with stable vitals. Pending labs. CXR negative for acute pathology.





12/14/18 13:37


Labs WNL. Pending UA.





12/14/18 17:34


UA indicates UTI. Giving ceftriaxone.





Will d/c with abx and PCP f/u.





*DC/Admit/Observation/Transfer


Diagnosis at time of Disposition: 


 Bronchitis





UTI (urinary tract infection)


Qualifiers:


 Urinary tract infection type: site unspecified Hematuria presence: without 

hematuria Qualified Code(s): N39.0 - Urinary tract infection, site not specified








- Discharge Dispostion


Disposition: HOME


Condition at time of disposition: Stable


Decision to Admit order: No





- Prescriptions


Prescriptions: 


Sulfamethoxazole/Trimethoprim [Bactrim Ds -] 1 tab PO BID #14 tablet





- Referrals


Referrals: 


Renee Arteaga MD [Primary Care Provider] - 





- Patient Instructions


Printed Discharge Instructions:  DI for Acute Bronchitis


Additional Instructions: 


Please follow up with your primary care physician in 2-3 days. 





Please return to the ER if you have any signs or symptoms of chest pain, 

shortness of breath, uncontrollable fever, chills, nausea, vomiting, numbness, 

tingling, or weakness in any part of your body, changes in vision, or slurred 

speech.





Please take your medications as prescribed.





Please return to the ER if symptoms persist, worsen, or new symptoms arise.








- Post Discharge Activity

## 2018-12-14 NOTE — PDOC
Attending Attestation





- Resident


Resident Name: Tone Pereyra





- ED Attending Attestation


I have performed the following: I have examined & evaluated the patient, The 

case was reviewed & discussed with the resident, I agree w/resident's findings 

& plan, Exceptions are as noted





- HPI


HPI: 





12/14/18 12:05


86y F hx of htn, hl, vertigo, presents with 2 weeks of nonproductive cough.  

The patient states that she had some nasal congestion approximately 2 weeks ago 

with a mild productive cough the symptoms seemed to improve after sure. At 

times about 2 days ago the cough came back however was a dry cough. The patient 

endorses a couple of episodes of waking up sweaty at night last week however 

that is since resolved. The patient denies any chest pain, dyspnea exertion, 

leg swelling, hemoptysis, calf pain no recent travel or known sick contacts. 

The patient came today as a cough was not going away.  





The patient denies any diarrhea, melena, BPR, current headache, vision changes, 

current dizziness.





The patient does endorses at intermittent episodes of room spinning dizziness 

that comes sporadically it does seem to have come more frequently in the past 2 

weeks with her URI which is typical for her.  She denies any dizziness at this 

time the patient denies any focal weakness, numbness, tingling, vision changes, 

speech changes


.





PMD: Dr. Arteaga








GENERAL: The patient is awake, alert, and fully oriented, Nontoxic - in no 

acute distress.


HEAD: Normocephalic, atraumatic.


EYES: extraocular movements intact, sclera anicteric, conjunctiva clear.


ENT: Normal voice,  Moist mucous membranes.


NECK: Normal range of motion, supple


LUNGS: Breath sounds equal, clear to auscultation bilaterally.  No wheezes, no 

rhonchi, no rales. speaking complete sentences


HEART: Regular rate and rhythm, normal S1 and S2 without murmur, rub or gallop.


ABDOMEN: Soft, nontender, normoactive bowel sounds.  No guarding, no rebound.  

. No CVA tenderness


EXTREMITIES: Normal range of motion, trace pitting edema.  neg homans, no calf 

tenderness


NEUROLOGICAL: No facial assymetry, Normal speech, moving all 4 ext 

spontaneously and symmetrically 


PSYCH: Normal mood, normal affect.


SKIN: Warm, Dry, normal turgor,





suspect viral URI


normal exam


will ck labs to r/o metabolic dernagement, anemia, leukocytosis


screening ekg


cxr to r/o pna





if neg anticipate dc with pmd fu











- Physicial Exam


PE: 





12/15/18 08:23


see above





- Medical Decision Making





12/14/18 16:23


labs, cxr negative


ua c/w UTI


will treat with abx





will dc with pmd fu


return precautions were discussed

## 2019-06-11 ENCOUNTER — HOSPITAL ENCOUNTER (EMERGENCY)
Dept: HOSPITAL 74 - JER | Age: 84
Discharge: HOME | End: 2019-06-11
Payer: COMMERCIAL

## 2019-06-11 VITALS — SYSTOLIC BLOOD PRESSURE: 136 MMHG | DIASTOLIC BLOOD PRESSURE: 73 MMHG | HEART RATE: 77 BPM | TEMPERATURE: 98.1 F

## 2019-06-11 VITALS — BODY MASS INDEX: 27.8 KG/M2

## 2019-06-11 DIAGNOSIS — J40: Primary | ICD-10-CM

## 2019-06-11 DIAGNOSIS — E78.5: ICD-10-CM

## 2019-06-11 DIAGNOSIS — I10: ICD-10-CM

## 2019-06-11 DIAGNOSIS — Z85.038: ICD-10-CM

## 2019-06-11 DIAGNOSIS — K21.9: ICD-10-CM

## 2019-06-11 LAB
ALBUMIN SERPL-MCNC: 3.7 G/DL (ref 3.4–5)
ALP SERPL-CCNC: 79 U/L (ref 45–117)
ALT SERPL-CCNC: 17 U/L (ref 13–61)
ANION GAP SERPL CALC-SCNC: 7 MMOL/L (ref 8–16)
APPEARANCE UR: CLEAR
AST SERPL-CCNC: 14 U/L (ref 15–37)
BACTERIA #/AREA URNS HPF: 23.7 /HPF
BASOPHILS # BLD: 0.6 % (ref 0–2)
BILIRUB SERPL-MCNC: 0.3 MG/DL (ref 0.2–1)
BILIRUB UR STRIP.AUTO-MCNC: NEGATIVE MG/DL
BUN SERPL-MCNC: 14.3 MG/DL (ref 7–18)
CALCIUM SERPL-MCNC: 9.7 MG/DL (ref 8.5–10.1)
CASTS #/AREA URNS LPF: 2 /LPF (ref 0–8)
CHLORIDE SERPL-SCNC: 96 MMOL/L (ref 98–107)
CO2 SERPL-SCNC: 30 MMOL/L (ref 21–32)
COLOR UR: YELLOW
CREAT SERPL-MCNC: 0.9 MG/DL (ref 0.55–1.3)
DEPRECATED RDW RBC AUTO: 13.8 % (ref 11.6–15.6)
EOSINOPHIL # BLD: 5 % (ref 0–4.5)
EPITH CASTS URNS QL MICRO: 2.9 /HPF
GLUCOSE SERPL-MCNC: 93 MG/DL (ref 74–106)
HCT VFR BLD CALC: 41.1 % (ref 32.4–45.2)
HGB BLD-MCNC: 13.8 GM/DL (ref 10.7–15.3)
KETONES UR QL STRIP: NEGATIVE
LEUKOCYTE ESTERASE UR QL STRIP.AUTO: (no result)
LYMPHOCYTES # BLD: 25.5 % (ref 8–40)
MCH RBC QN AUTO: 29.8 PG (ref 25.7–33.7)
MCHC RBC AUTO-ENTMCNC: 33.5 G/DL (ref 32–36)
MCV RBC: 88.9 FL (ref 80–96)
MONOCYTES # BLD AUTO: 10.1 % (ref 3.8–10.2)
NEUTROPHILS # BLD: 58.8 % (ref 42.8–82.8)
NITRITE UR QL STRIP: NEGATIVE
PH UR: 7 [PH] (ref 5–8)
PLATELET # BLD AUTO: 202 K/MM3 (ref 134–434)
PMV BLD: 8.2 FL (ref 7.5–11.1)
POTASSIUM SERPLBLD-SCNC: 4.1 MMOL/L (ref 3.5–5.1)
PROT SERPL-MCNC: 7.3 G/DL (ref 6.4–8.2)
PROT UR QL STRIP: NEGATIVE
PROT UR QL STRIP: NEGATIVE
RBC # BLD AUTO: 1 /HPF (ref 0–4)
RBC # BLD AUTO: 4.62 M/MM3 (ref 3.6–5.2)
SODIUM SERPL-SCNC: 132 MMOL/L (ref 136–145)
SP GR UR: 1.01 (ref 1.01–1.03)
UROBILINOGEN UR STRIP-MCNC: 0.2 MG/DL (ref 0.2–1)
WBC # BLD AUTO: 5.7 K/MM3 (ref 4–10)
WBC # UR AUTO: 2 /HPF (ref 0–5)

## 2019-06-11 PROCEDURE — 3E033NZ INTRODUCTION OF ANALGESICS, HYPNOTICS, SEDATIVES INTO PERIPHERAL VEIN, PERCUTANEOUS APPROACH: ICD-10-PCS

## 2019-06-11 NOTE — PDOC
History of Present Illness





- General


Chief Complaint: Pain


Stated Complaint: ABD PAIN


Time Seen by Provider: 06/11/19 07:12





- History of Present Illness


Initial Comments: 


85 year old female with PMHx of GERD, HTN, HLD, vertigo (medication refractory)

, colon CA s/p removal of tumor and anxiety presenting with cough. Patient 

states the cough started on Friday and since it had not improved, decided to 

come to the ED after she did not feel better. Sometimes when she coughs, she 

feels lower abdominal pain that is rated 7-8/10 and described as a "crushing" 

that resolves when the coughing stops. Denies urinary symptoms. Had an isolated 

episode of diarrhea yesterday but normal formed brown stool today without 

blood. Has passed flatulence today. No nausea or vomiting. Feels somewhat short 

of breath while having a coughing fit. Reporting generalized body aches and 

felt weak when she woke up today. She has taken a medicine that starts with a "D

" (likely Dextromethorphan) with no relief of her symptoms. Endorses normal 

appetite. Sometimes ambulates with a walker when she feels dizzy and has not 

had any recent falls. Denies fever, chills, or chest pain. 





PCP: Dr. Renee Arteaga








Past History





- Past Medical History


Allergies/Adverse Reactions: 


 Allergies











Allergy/AdvReac Type Severity Reaction Status Date / Time


 


clams Allergy   Verified 06/11/19 06:26


 


corn Allergy   Verified 06/11/19 06:26


 


milk Allergy   Verified 06/11/19 06:26


 


peanut Allergy   Verified 06/11/19 06:26


 


soybean Allergy   Verified 06/11/19 06:26


 


walnut Allergy   Verified 06/11/19 06:26


 


wheat Allergy   Verified 06/11/19 06:26


 


CLAMS Allergy   Uncoded 06/11/19 06:26


 


EGG WHITES Allergy   Uncoded 06/11/19 06:26


 


SHRIMP Allergy   Uncoded 06/11/19 06:26











Home Medications: 


Ambulatory Orders





Amitriptyline HCl [Elavil -] 25 mg PO DAILY 01/20/14 


Atenolol [Tenormin -] 50 mg PO DAILY 01/20/14 


Losartan Potassium 50 mg PO DAILY 01/20/14 


Cholecalciferol (Vitamin D3) [Vitamin D] 1,000 unit PO DAILY 02/20/16 


Simvastatin [Zocor -] 20 mg PO HS 02/20/16 


Hydrochlorothiazide [Hctz -] 12.5 mg PO DAILY 12/14/18 


Meloxicam 7.5 mg PO DAILY 12/14/18 


Omeprazole 20 mg PO DAILY 12/14/18 


Polyethylene Glycol 3350 [Purelax] 17 gm PO DAILY 12/14/18 


Nitrofurantoin Macrocrystal [Macrodantin -] 100 mg PO BID #14 capsule 12/17/18 








COPD: No


CHF: No


GI Disorders: Yes (gerd)


HTN: Yes


Hypercholesterolemia: Yes


Psychiatric Problems: Yes (anxeity)





- Surgical History


Abdominal Surgery: Yes (TUMOR REMOVED FROM COLON)





- Immunization History


Immunization Up to Date: Yes





- Suicide/Smoking/Psychosocial Hx


Smoking History: Never smoked


Have you smoked in the past 12 months: No


Information on smoking cessation initiated: No


Hx Alcohol Use: No


Drug/Substance Use Hx: No


Substance Use Type: Alcohol





**Review of Systems





- Review of Systems


Comments:: 


Constitutional: no fever, no chills


HEENT: no throat pain, no dysphagia


Cardiovascular: no chest pain, no palpitations


Respiratory: +cough, +shortness of breath


Gastrointestinal: +abdominal pain, no nausea


Genitourinary: no dysuria, no frequency


Musculoskeletal: +myalgia, no arthralgia


Skin: no rash, no itching


Neurologic: no headache, +weakness








*Physical Exam





- Vital Signs


 Last Vital Signs











Temp Pulse Resp BP Pulse Ox


 


 98.1 F   77   16   136/73   96 


 


 06/11/19 06:10  06/11/19 06:10  06/11/19 06:10  06/11/19 06:10  06/11/19 06:10














- Physical Exam


Comments: 


General: Awake, alert, and fully oriented, in no acute distress


Head: No signs of trauma


Eyes: EOMI, sclera anicteric


ENT: Moist mucus membranes


Neck: Normal ROM, supple


Lungs: Crackles present at the right lung base


Cardio: Regular rhythm, S1 and S2 present


Abdomen: Reducible ventral hernias present. Tender to palpation overlying area 

of hernias. Old surgical scars present. Soft. No guarding, no rebound.


Extremities: Normal range of motion, Distal pulses present, No calf tenderness


SKIN: Warm, Dry, normal turgor


Neurologic: Cranial nerves II through XII grossly intact. Normal speech











ED Treatment Course





- LABORATORY


CBC & Chemistry Diagram: 


 06/11/19 08:27





 06/11/19 08:27





Medical Decision Making





- Medical Decision Making


85 year old female with PMHx of GERD, HTN, HLD, vertigo (medication refractory)

, colon CA s/p removal of tumor and anxiety presenting with cough. 


DDX including but not limited to PNA, bronchitis, UTI, URI, ventral hernia, 

incarcerated hernia


Labs, CXR, EKG


1g Ofirmev





Lower suspicion for incarcerated hernia as patient has had normal bowel 

movements and has passed flatulence today, and has not had nausea or vomiting. 

It is likely that patient feels abdominal pain only while coughing as the 

increase in intra-abdominal pressure causes her hernia to increase. 


Given crackles at right lung base, high suspicion for pneumonia-- if this is 

the case, I anticipate patient will score low on the CURB-65 and can likely be 

managed on an outpatient basis. 


06/11/19 07:13





 CBC











WBC  5.7 K/mm3 (4.0-10.0)   06/11/19  08:27    


 


RBC  4.62 M/mm3 (3.60-5.2)   06/11/19  08:27    


 


Hgb  13.8 GM/dL (10.7-15.3)   06/11/19  08:27    


 


Hct  41.1 % (32.4-45.2)   06/11/19  08:27    


 


MCV  88.9 fl (80-96)   06/11/19  08:27    


 


MCH  29.8 pg (25.7-33.7)   06/11/19  08:27    


 


MCHC  33.5 g/dl (32.0-36.0)   06/11/19  08:27    


 


RDW  13.8 % (11.6-15.6)   06/11/19  08:27    


 


Plt Count  202 K/MM3 (134-434)  D 06/11/19  08:27    


 


MPV  8.2 fl (7.5-11.1)   06/11/19  08:27    


 


Absolute Neuts (auto)  3.3 K/mm3 (1.5-8.0)   06/11/19  08:27    


 


Neutrophils %  58.8 % (42.8-82.8)   06/11/19  08:27    


 


Lymphocytes %  25.5 % (8-40)  D 06/11/19  08:27    


 


Monocytes %  10.1 % (3.8-10.2)  D 06/11/19  08:27    


 


Eosinophils %  5.0 % (0-4.5)  H  06/11/19  08:27    


 


Basophils %  0.6 % (0-2.0)   06/11/19  08:27    


 


Nucleated RBC %  0 % (0-0)   06/11/19  08:27    








No anemia or leukocytosis





 CMP











Sodium  132 mmol/L (136-145)  L  06/11/19  08:27    


 


Potassium  4.1 mmol/L (3.5-5.1)   06/11/19  08:27    


 


Chloride  96 mmol/L ()  L  06/11/19  08:27    


 


Carbon Dioxide  30 mmol/L (21-32)   06/11/19  08:27    


 


Anion Gap  7 MMOL/L (8-16)  L  06/11/19  08:27    


 


BUN  14.3 mg/dL (7-18)   06/11/19  08:27    


 


Creatinine  0.9 mg/dL (0.55-1.3)   06/11/19  08:27    


 


Est GFR (CKD-EPI)AfAm  67.10   06/11/19  08:27    


 


Est GFR (CKD-EPI)NonAf  57.90   06/11/19  08:27    


 


Random Glucose  93 mg/dL ()   06/11/19  08:27    


 


Calcium  9.7 mg/dL (8.5-10.1)   06/11/19  08:27    


 


Total Bilirubin  0.3 mg/dL (0.2-1)   06/11/19  08:27    


 


AST  14 U/L (15-37)  L  06/11/19  08:27    


 


ALT  17 U/L (13-61)   06/11/19  08:27    


 


Alkaline Phosphatase  79 U/L ()   06/11/19  08:27    


 


Total Protein  7.3 g/dl (6.4-8.2)   06/11/19  08:27    


 


Albumin  3.7 g/dl (3.4-5.0)   06/11/19  08:27    











Sodium noted to be low, but this is at patient's baseline


Electrolytes otherwise unremarkable





Portable CXR may show some blunting of the right diaphragmatic angle, but 

equivocal. PA/Lat film ordered for better image. 





EKG: rate 66, QTc 410, NSR


06/11/19 09:36





PA/Lat with sharp diaphragmatic angles and enlarged cardiac silouhette


06/11/19 10:04





CXR: "2 views of the chest reveal a large heart, unfolded aorta, minimal 

atelectatic changes left base and some prominence of the superior mediastinum 

vertical sutures or calcifications by the right clavicular head which have been 

present going back to at least 12/14/2018. There are degenerative spine changes 

with wedging, no sign of infiltrate or failure and sharp angles. Soft tissues 

are intact. Since the prior study of 6/11/2019, there is no significant change 

other than some new atelectatic changes at the left base. Impression: Large 

heart. Left base atelectatic changes. See discussion above. "





No pneumonia seen on imaging; cough likely due to viral syndrome





Plan to discharge


06/11/19 10:47











*DC/Admit/Observation/Transfer


Diagnosis at time of Disposition: 


 Cough








- Discharge Dispostion


Disposition: HOME


Condition at time of disposition: Stable





- Referrals


Referrals: 


Renee Arteaga MD [Primary Care Provider] - 





- Patient Instructions


Printed Discharge Instructions:  DI for Cough -- Adult


Additional Instructions: 


You came into the emergency department for a cough. Blood work was within 

normal limits. Xray did not show a pneumonia. 





You can take over-the-counter tylenol or motrin for your pain. Follow the 

instructions on the medication bottle. 





Follow-up with your  primary care doctor in two to three days to discuss this 

ED visit and to further evaluate your symptoms. Call today and make an 

appointment. Your workup is not complete until you do so. 





Immediate medical attention is required if you have: you develop swelling of 

the neck or tongue, stiff neck or difficulty opening the mouth, skin rash, 

difficulty breathing, persistent nausea or vomiting, black stool, or any new or 

concerning symptoms.  If you think you are having an emergency, call for 

emergency medical services or present to the emergency department right away








- Post Discharge Activity

## 2019-06-11 NOTE — EKG
Test Reason : 

Blood Pressure : ***/*** mmHG

Vent. Rate : 066 BPM     Atrial Rate : 086 BPM

   P-R Int : 000 ms          QRS Dur : 070 ms

    QT Int : 392 ms       P-R-T Axes : 000 -06 004 degrees

   QTc Int : 410 ms

 

*** POOR DATA QUALITY, INTERPRETATION MAY BE ADVERSELY AFFECTED

ACCELERATED JUNCTIONAL RHYTHM

NONSPECIFIC ST AND T WAVE ABNORMALITY

ABNORMAL ECG

WHEN COMPARED WITH ECG OF 14-DEC-2018 10:55,

JUNCTIONAL RHYTHM HAS REPLACED SINUS RHYTHM

Confirmed by MD Ry, Yogesh ([id number]) on 6/11/2019 3:25:00 PM

 

Referred By:             Confirmed By:Yogesh Raza MD

## 2019-06-11 NOTE — PDOC
Attending Attestation





- Resident


Resident Name: Kelsi Marcos





- ED Attending Attestation


I have performed the following: I have examined & evaluated the patient, The 

case was reviewed & discussed with the resident, I agree w/resident's findings 

& plan, Exceptions are as noted





- HPI


HPI: 





06/11/19 08:40


84yo F hx HTN, HL, GERD, colon ca s/p tumor resection, vertigo presents to the 

ED with cough for 5 days. Pt reports feeling soreness in her epigastric area 

and SOB when she coughs. The SOB and abdominal pain occur only when she is 

coughing. Denies productive cough. Pt has no associated fevers, chills, headache

, weakness/numbness, dizziness, CP, abd pain, N/V/D, LE edema. 





- Physicial Exam


PE: 





06/11/19 17:55


agree with resident exam with the following exceptions





Pleasant well appearing, non toxic pt


Lungs CTAB no wheezes or crackles. Normal WOB


WWP peripherally








- Medical Decision Making





06/11/19 17:58


85yo F prsents to the ED with cough a/w sob and abd pain when coughing. 


Pt very well appearing


Vitals and exam wnl


CXR clear with atelectasis at LL base - no crackles auscultated on rpt exam


Likely bronchitis. 


Pt feels well, will f/u with PMD 





I discussed the physical exam findings, ancillary test results and final 

diagnoses with the patient. I answered all of the patient's questions. The 

patient was satisfied with the care received and felt comfortable with the 

discharge plan and treatment plan. The patient will call their primary care 

physician within 24 hours to arrange follow-up and will return to the Emergency 

Department with any new, persistent or worsening symptoms. 





**Heart Score/ECG Review


  ** #1





06/11/19 17:56


MY EKG read: NSR< rate 66. Normal axis and intervals. No RIYA. TW changes 

similar to EKG from 12/14/18

## 2019-06-13 ENCOUNTER — HOSPITAL ENCOUNTER (EMERGENCY)
Dept: HOSPITAL 74 - JER | Age: 84
Discharge: HOME | End: 2019-06-13
Payer: COMMERCIAL

## 2019-06-23 ENCOUNTER — HOSPITAL ENCOUNTER (EMERGENCY)
Dept: HOSPITAL 74 - JER | Age: 84
Discharge: HOME | End: 2019-06-23
Payer: COMMERCIAL

## 2019-06-23 VITALS — BODY MASS INDEX: 28 KG/M2

## 2019-06-23 VITALS — TEMPERATURE: 98.3 F | HEART RATE: 90 BPM | SYSTOLIC BLOOD PRESSURE: 139 MMHG | DIASTOLIC BLOOD PRESSURE: 69 MMHG

## 2019-06-23 DIAGNOSIS — I10: ICD-10-CM

## 2019-06-23 DIAGNOSIS — E78.5: ICD-10-CM

## 2019-06-23 DIAGNOSIS — J20.9: Primary | ICD-10-CM

## 2019-06-23 DIAGNOSIS — K21.9: ICD-10-CM

## 2019-06-23 DIAGNOSIS — F41.9: ICD-10-CM

## 2019-06-23 LAB
ALBUMIN SERPL-MCNC: 3.2 G/DL (ref 3.4–5)
ALP SERPL-CCNC: 65 U/L (ref 45–117)
ALT SERPL-CCNC: 21 U/L (ref 13–61)
ANION GAP SERPL CALC-SCNC: 6 MMOL/L (ref 8–16)
AST SERPL-CCNC: 18 U/L (ref 15–37)
BASOPHILS # BLD: 0.6 % (ref 0–2)
BILIRUB SERPL-MCNC: 0.4 MG/DL (ref 0.2–1)
BUN SERPL-MCNC: 9.4 MG/DL (ref 7–18)
CALCIUM SERPL-MCNC: 9.6 MG/DL (ref 8.5–10.1)
CHLORIDE SERPL-SCNC: 95 MMOL/L (ref 98–107)
CO2 SERPL-SCNC: 28 MMOL/L (ref 21–32)
CREAT SERPL-MCNC: 0.8 MG/DL (ref 0.55–1.3)
DEPRECATED RDW RBC AUTO: 13.7 % (ref 11.6–15.6)
EOSINOPHIL # BLD: 5.4 % (ref 0–4.5)
GLUCOSE SERPL-MCNC: 118 MG/DL (ref 74–106)
HCT VFR BLD CALC: 37.6 % (ref 32.4–45.2)
HGB BLD-MCNC: 12.8 GM/DL (ref 10.7–15.3)
LYMPHOCYTES # BLD: 12.2 % (ref 8–40)
MCH RBC QN AUTO: 29.4 PG (ref 25.7–33.7)
MCHC RBC AUTO-ENTMCNC: 34.1 G/DL (ref 32–36)
MCV RBC: 86.2 FL (ref 80–96)
MONOCYTES # BLD AUTO: 8.4 % (ref 3.8–10.2)
NEUTROPHILS # BLD: 73.4 % (ref 42.8–82.8)
PLATELET # BLD AUTO: 346 K/MM3 (ref 134–434)
PMV BLD: 7.7 FL (ref 7.5–11.1)
POTASSIUM SERPLBLD-SCNC: 4.1 MMOL/L (ref 3.5–5.1)
PROT SERPL-MCNC: 7 G/DL (ref 6.4–8.2)
RBC # BLD AUTO: 4.36 M/MM3 (ref 3.6–5.2)
SODIUM SERPL-SCNC: 130 MMOL/L (ref 136–145)
WBC # BLD AUTO: 10.3 K/MM3 (ref 4–10)

## 2019-06-23 RX ADMIN — AZITHROMYCIN ONE MG: 250 TABLET, FILM COATED ORAL at 22:29

## 2019-06-23 RX ADMIN — IPRATROPIUM BROMIDE AND ALBUTEROL SULFATE ONE: .5; 3 SOLUTION RESPIRATORY (INHALATION) at 22:52

## 2019-06-23 RX ADMIN — IPRATROPIUM BROMIDE AND ALBUTEROL SULFATE ONE AMP: .5; 3 SOLUTION RESPIRATORY (INHALATION) at 22:29

## 2019-06-23 RX ADMIN — AZITHROMYCIN ONE: 250 TABLET, FILM COATED ORAL at 23:03

## 2019-06-23 NOTE — PDOC
History of Present Illness





- General


Chief Complaint: Respiratory


Stated Complaint: DIARRHEA/WEAK





- History of Present Illness


Initial Comments: 





19 19:25


87 yo F with h/o HTN, HLD who p/w cough, wheezing, fatigue. Patient reports 2 

weeks of worsening cough, with yellow sputum production. Also endorses general 

malaise. Denies SOB. Denies home O2 or duoneb requirements. Reports OTC 

Diphehydramine with no relief of symptoms x 1 day. 





Patient denies night sweats, unintentional wt. loss, HA, vision change, 

palpitations,orthopena, PND, leg swelling/pain, N/V, F,C, CP, SOB, urinary 

complaints, hematuria, BPR, abdominal pain, constipation, lightheadedness, 

sensory changes.





PMHx: as noted above. Denies h/o MI, CAD, stent placement, CABG


ROS: as noted


SHx: Denies Etoh, IVDA, tobacco use


Allergies: NKDA











Past History





- Past Medical History


Allergies/Adverse Reactions: 


 Allergies











Allergy/AdvReac Type Severity Reaction Status Date / Time


 


clams Allergy   Verified 19 18:01


 


corn Allergy   Verified 19 18:01


 


milk Allergy   Verified 19 18:01


 


peanut Allergy   Verified 19 18:01


 


soybean Allergy   Verified 19 18:01


 


walnut Allergy   Verified 19 18:01


 


wheat Allergy   Verified 19 18:01


 


CLAMS Allergy   Uncoded 19 18:01


 


EGG WHITES Allergy   Uncoded 19 18:01


 


SHRIMP Allergy   Uncoded 19 18:01











Home Medications: 


Ambulatory Orders





Amitriptyline HCl [Elavil -] 25 mg PO DAILY 14 


Atenolol [Tenormin -] 50 mg PO DAILY 14 


Cholecalciferol (Vitamin D3) [Vitamin D] 1,000 unit PO DAILY 16 


Simvastatin [Zocor -] 20 mg PO HS 16 


Hydrochlorothiazide [Hctz -] 12.5 mg PO DAILY 18 


Meloxicam 7.5 mg PO DAILY 18 


Omeprazole 20 mg PO ASDIR 18 


Polyethylene Glycol 3350 [Purelax] 17 gm PO DAILY 18 


Azithromycin [Zithromax Tri-Casey (3 DAYS) -] 500 mg PO DAILY #3 tablet 19 


Cyanocobalamin [Vitamin B12 -] 1,000 mcg PO DAILY 19 


Lisinopril 10 mg PO BID 19 


Spirometers and Accessories [Mistassist] 1 each MC DAILY #1 each 19 








COPD: No


CHF: No


GI Disorders: Yes (gerd)


HTN: Yes


Hypercholesterolemia: Yes


Psychiatric Problems: Yes (anxeity)





- Surgical History


Abdominal Surgery: Yes (TUMOR REMOVED FROM COLON)





- Immunization History


Immunization Up to Date: Yes





- Suicide/Smoking/Psychosocial Hx


Smoking History: Never smoked


Have you smoked in the past 12 months: No


Hx Alcohol Use: No


Drug/Substance Use Hx: No


Substance Use Type: Alcohol





**Review of Systems





- Review of Systems


Comments:: 





19 19:25


GENERAL/CONSTITUTIONAL: No fever or chills. 


HEAD, EYES, EARS, NOSE AND THROAT: No change in vision. No ear pain or 

discharge. No sore throat.


CARDIOVASCULAR: No chest pain or shortness of breath


RESPIRATORY: + cough, wheezing. No hemoptysis.


GASTROINTESTINAL: No nausea, vomiting, diarrhea or constipation.


GENITOURINARY: No dysuria, frequency, or change in urination.


MUSCULOSKELETAL: No joint or muscle swelling or pain. No neck or back pain.


SKIN: No rash


NEUROLOGIC: No headache, vertigo, loss of consciousness, or change in strength/

sensation.


ENDOCRINE: No increased thirst. No abnormal weight change


HEMATOLOGIC/LYMPHATIC: No anemia, easy bleeding, or history of blood clots.


ALLERGIC/IMMUNOLOGIC: No hives or skin allergy.








*Physical Exam





- Vital Signs


 Last Vital Signs











Temp Pulse Resp BP Pulse Ox


 


 98.3 F   90   18   139/69   99 


 


 19 17:58  19 17:58  19 17:58  19 17:58  19 17:58














- Physical Exam


Comments: 





19 19:25


GENERAL: Awake, alert, and fully oriented, in no acute distress


HEAD: No signs of trauma, normocephalic, atraumatic 


EYES: PERRLA, EOMI, sclera anicteric, conjunctiva clear


ENT: Hearing grossly normal, nares patent, oropharynx clear without


exudates. Moist mucosa


NECK: Normal ROM, supple, no lymphadenopathy, JVD, or masses


LUNGS: Coarse right lung sounds, diffuse exp rhonci. No distress, speaks full 

sentences. 


HEART: Regular rate and rhythm, normal S1 and S2, no murmurs, rubs or gallops, 

peripheral pulses normal and equal bilaterally. 


ABDOMEN: Soft, nontender, normoactive bowel sounds.  No guarding, no rebound.  

No masses


EXTREMITIES : 1+ pitting edema BL. Normal inspection, Normal range of motion.  

No clubbing or cyanosis. 


NEUROLOGICAL: Cranial nerves II through XII grossly intact.  Normal speech, 

normal gait, no focal sensorimotor deficits 


SKIN: Warm, Dry, normal turgor, no rashes or lesions noted








ED Treatment Course





- LABORATORY


CBC & Chemistry Diagram: 


 19 19:46





 19 19:46





Medical Decision Making





- Medical Decision Making





19 19:26


87 yo F with h/o HTN, HLD who p/w cough with yellow sputum production, wheezing

, fatigue. Vitals wnl, AF, A&Ox3. Phyiscal exam notable for coarse lung sounds 

right sided, and diffuse exp rhonci. R/o PNA. DDx: asthma, /bronchitis, 

viral URI. 





19 19:38


ED Course: 


19 20:01





Zithromax, Prednisone, Duoneb


19 21:16





EKst degree AV block with TWI V1-V4, III, AvF. Nml interval duration, and 

axis. Absent acute RIYA, STD. Nml R wave progression. Unchanged from interval 

EKG (19) 


19 21:18


 Laboratory Tests











  19





  19:46 19:46


 


WBC  10.3 H 


 


Hgb  12.8 


 


Hct  37.6 


 


Plt Count  346  D 


 


Sodium   130 L


 


Chloride   95 L


 


BUN   9.4


 


Creatinine   0.8











19 21:57


CXR: Unremarkable


Pt. stable for d/c with return precautions


Spriometer, and zithromax sent to pharmacy


Advised to f/u with PMD








*DC/Admit/Observation/Transfer


Diagnosis at time of Disposition: 


 Cough with sputum








- Prescriptions


Prescriptions: 


Azithromycin [Zithromax Tri-Casey (3 DAYS) -] 500 mg PO DAILY #3 tablet


Spirometers and Accessories [Mistassist] 1 each  DAILY #1 each





- Referrals





- Patient Instructions


Printed Discharge Instructions:  DI for Acute Bronchitis


Additional Instructions: 


Please return to the emergency department with any new or worsening symptoms or 

concerns. Please follow up with your primary care physician within 72 hours. 

Please take Zithromax daily for 3 days. Please use spirometer daily for 

adequate pulmonary hygeine. 








- Post Discharge Activity

## 2019-06-23 NOTE — PDOC
Documentation entered by Taina Mckeon SCRIBE, acting as scribe for Rosalind Fulton MD.








Rosalind Fulton MD:  This documentation has been prepared by the Mike bradford Mackenzie, SCRIBE, under my direction and personally reviewed by me in its 

entirety.  I confirm that the documentation accurately reflects all work, 

treatment, procedures, and medical decision making performed by me.  





Attending Attestation





- Resident


Resident Name: Uziel Swann





- ED Attending Attestation


I have performed the following: I have examined & evaluated the patient, The 

case was reviewed & discussed with the resident, I agree w/resident's findings 

& plan





- HPI


HPI: 


The patient is an 86 year old female, with a significant PMH HTN and HLD of who 

presents to the emergency department with a progressively worsening cough for 2 

weeks. Patient states the cough is productive of yellow sputum. Patient notes 

symptoms of wheezing, fatigue, diarrhea, and general malaise. Patient reports 

administering OTC Diphenhydramine but experienced no relief. 





The patient denies chest pain, shortness of breath, headache and dizziness.


Denies fever, chills, nausea, vomiting, and constipation.


Denies dysuria, frequency, urgency and hematuria.





Allergies: NKDA


Past surgical history: None reported


Social history: Denies drug, ETOH, and tobacco use. 


PCP: None reported.








06/23/19 22:45








- Physicial Exam


PE: 





GENERAL: Awake, alert, and fully oriented, in no acute distress


HEAD: No signs of trauma


EYES: PERRLA, EOMI, sclera anicteric, conjunctiva clear


ENT: Auricles normal inspection, hearing grossly normal, nares patent, 

oropharynx clear without exudates. Moist mucosa


NECK: Normal ROM, supple, no lymphadenopathy, JVD, or masses


LUNGS:(+) Bilateral fibrotic crackles. Breath sounds equal, clear to 

auscultation bilaterally. No wheezes. 


HEART: Regular rate and rhythm, normal S1 and S2, no murmurs, rubs or gallops


ABDOMEN: Soft, nontender, normoactive bowel sounds.  No guarding, no rebound.  

No masses


EXTREMITIES: (+)1+ pitting edema bilaterally. Normal range of motion. No 

clubbing or cyanosis. No cords, erythema, or tenderness


NEUROLOGICAL: Cranial nerves II through XII grossly intact.  Normal speech, 

normal gait


SKIN: Warm, Dry, normal turgor, no rashes or lesions noted.








06/23/19 22:43








- Medical Decision Making





06/23/19 22:59


Increased lung markings on CXR bilat compared to 2 weeks ago. At that time she 

was found to have atelectasis.  We sent off a BNP and card enzyme blood test to 

make sure that the crackles we are hearing isn't fluid in the lungs, rather 

atelectasis - in which case we will give her an incentive spirometer.


Pt has a persistent dry cough x 2 weeks.  We will treat that with zithromax 

orally; I will give a dose of ceftriaxone here.


Pt is afebrile.

## 2019-06-24 NOTE — EKG
Test Reason : 

Blood Pressure : ***/*** mmHG

Vent. Rate : 075 BPM     Atrial Rate : 075 BPM

   P-R Int : 214 ms          QRS Dur : 076 ms

    QT Int : 378 ms       P-R-T Axes : 075 -08 -02 degrees

   QTc Int : 422 ms

 

SINUS RHYTHM WITH 1ST DEGREE A-V BLOCK

INFERIOR INFARCT , AGE UNDETERMINED

ANTERIOR INFARCT (CITED ON OR BEFORE 13-JUN-2019)

ABNORMAL ECG

WHEN COMPARED WITH ECG OF 13-JUN-2019 10:42,

T WAVE VARIATION

Confirmed by VÍCTOR SOLIS MD ([id number]) on 6/24/2019 10:35:12 AM

 

Referred By:             Confirmed By:VÍCTOR SOLIS MD

## 2021-03-03 ENCOUNTER — HOSPITAL ENCOUNTER (EMERGENCY)
Dept: HOSPITAL 74 - JER | Age: 86
Discharge: HOME | End: 2021-03-03
Payer: COMMERCIAL

## 2021-03-03 VITALS — BODY MASS INDEX: 20.9 KG/M2

## 2021-03-03 VITALS — TEMPERATURE: 98 F

## 2021-03-03 VITALS — SYSTOLIC BLOOD PRESSURE: 140 MMHG | HEART RATE: 63 BPM | DIASTOLIC BLOOD PRESSURE: 72 MMHG

## 2021-03-03 DIAGNOSIS — R05: ICD-10-CM

## 2021-03-03 DIAGNOSIS — U07.1: Primary | ICD-10-CM

## 2021-03-03 PROCEDURE — C9803 HOPD COVID-19 SPEC COLLECT: HCPCS

## 2021-03-03 PROCEDURE — U0003 INFECTIOUS AGENT DETECTION BY NUCLEIC ACID (DNA OR RNA); SEVERE ACUTE RESPIRATORY SYNDROME CORONAVIRUS 2 (SARS-COV-2) (CORONAVIRUS DISEASE [COVID-19]), AMPLIFIED PROBE TECHNIQUE, MAKING USE OF HIGH THROUGHPUT TECHNOLOGIES AS DESCRIBED BY CMS-2020-01-R: HCPCS

## 2021-03-03 PROCEDURE — M0239 BAMLANIVIMAB-XXXX INFUSION: HCPCS

## 2021-03-08 ENCOUNTER — HOSPITAL ENCOUNTER (EMERGENCY)
Dept: HOSPITAL 74 - JER | Age: 86
LOS: 1 days | Discharge: HOME | End: 2021-03-09
Payer: COMMERCIAL

## 2021-03-08 VITALS — TEMPERATURE: 97.9 F

## 2021-03-08 VITALS — BODY MASS INDEX: 29.2 KG/M2

## 2021-03-08 DIAGNOSIS — R00.2: Primary | ICD-10-CM

## 2021-03-08 DIAGNOSIS — E83.42: ICD-10-CM

## 2021-03-08 LAB
ALBUMIN SERPL-MCNC: 3 G/DL (ref 3.4–5)
ALP SERPL-CCNC: 60 U/L (ref 45–117)
ALT SERPL-CCNC: 23 U/L (ref 13–61)
ANION GAP SERPL CALC-SCNC: 14 MMOL/L (ref 8–16)
APPEARANCE UR: CLEAR
APTT BLD: 32.1 SECONDS (ref 25.2–36.5)
AST SERPL-CCNC: 31 U/L (ref 15–37)
BACTERIA # UR AUTO: 524 /UL (ref 0–1359)
BASOPHILS # BLD: 0.5 % (ref 0–2)
BILIRUB DIRECT SERPL-MCNC: 334 U/L (ref 84–246)
BILIRUB SERPL-MCNC: 0.5 MG/DL (ref 0.2–1)
BILIRUB UR STRIP.AUTO-MCNC: NEGATIVE MG/DL
BNP SERPL-MCNC: 329.8 PG/ML (ref 5–450)
BUN SERPL-MCNC: 12.4 MG/DL (ref 7–18)
CALCIUM SERPL-MCNC: 8.8 MG/DL (ref 8.5–10.1)
CASTS URNS QL MICRO: 0 /UL (ref 0–3.1)
CHLORIDE SERPL-SCNC: 101 MMOL/L (ref 98–107)
CO2 SERPL-SCNC: 17 MMOL/L (ref 21–32)
COLOR UR: YELLOW
CREAT SERPL-MCNC: 0.8 MG/DL (ref 0.55–1.3)
DEPRECATED RDW RBC AUTO: 14.5 % (ref 11.6–15.6)
EOSINOPHIL # BLD: 2.8 % (ref 0–4.5)
EPITH CASTS URNS QL MICRO: 15 /UL (ref 0–25.1)
GLUCOSE SERPL-MCNC: 89 MG/DL (ref 74–106)
HCT VFR BLD CALC: 39.6 % (ref 32.4–45.2)
HGB BLD-MCNC: 13.8 GM/DL (ref 10.7–15.3)
INR BLD: 1.01 (ref 0.83–1.09)
KETONES UR QL STRIP: (no result)
LEUKOCYTE ESTERASE UR QL STRIP.AUTO: (no result)
LYMPHOCYTES # BLD: 35.2 % (ref 8–40)
MAGNESIUM SERPL-MCNC: 1.4 MG/DL (ref 1.8–2.4)
MCH RBC QN AUTO: 29.1 PG (ref 25.7–33.7)
MCHC RBC AUTO-ENTMCNC: 34.7 G/DL (ref 32–36)
MCV RBC: 83.7 FL (ref 80–96)
MONOCYTES # BLD AUTO: 8.4 % (ref 3.8–10.2)
NEUTROPHILS # BLD: 53.1 % (ref 42.8–82.8)
NITRITE UR QL STRIP: NEGATIVE
PH UR: 7 [PH] (ref 5–8)
PLATELET # BLD AUTO: 237 K/MM3 (ref 134–434)
PMV BLD: 8.2 FL (ref 7.5–11.1)
POTASSIUM SERPLBLD-SCNC: 4.6 MMOL/L (ref 3.5–5.1)
PROT SERPL-MCNC: 6.8 G/DL (ref 6.4–8.2)
PROT UR QL STRIP: NEGATIVE
PROT UR QL STRIP: NEGATIVE
PT PNL PPP: 12.2 SEC (ref 9.7–13)
RBC # BLD AUTO: 3 /UL (ref 0–23.9)
RBC # BLD AUTO: 4.73 M/MM3 (ref 3.6–5.2)
SODIUM SERPL-SCNC: 132 MMOL/L (ref 136–145)
SP GR UR: 1.01 (ref 1.01–1.03)
UROBILINOGEN UR STRIP-MCNC: 0.2 MG/DL (ref 0.2–1)
WBC # BLD AUTO: 4.3 K/MM3 (ref 4–10)
WBC # UR AUTO: 25 /UL (ref 0–25.8)

## 2021-03-08 PROCEDURE — 3E033NZ INTRODUCTION OF ANALGESICS, HYPNOTICS, SEDATIVES INTO PERIPHERAL VEIN, PERCUTANEOUS APPROACH: ICD-10-PCS

## 2021-03-09 VITALS — SYSTOLIC BLOOD PRESSURE: 152 MMHG | DIASTOLIC BLOOD PRESSURE: 71 MMHG | HEART RATE: 82 BPM

## 2021-07-11 ENCOUNTER — HOSPITAL ENCOUNTER (INPATIENT)
Dept: HOSPITAL 74 - JER | Age: 86
LOS: 5 days | Discharge: NURSING, REHAB OR LONG-TERM CARE | DRG: 149 | End: 2021-07-16
Payer: COMMERCIAL

## 2021-07-11 VITALS — BODY MASS INDEX: 29.3 KG/M2

## 2021-07-11 DIAGNOSIS — E78.5: ICD-10-CM

## 2021-07-11 DIAGNOSIS — R42: Primary | ICD-10-CM

## 2021-07-11 DIAGNOSIS — Z85.038: ICD-10-CM

## 2021-07-11 DIAGNOSIS — F41.9: ICD-10-CM

## 2021-07-11 DIAGNOSIS — I10: ICD-10-CM

## 2021-07-11 DIAGNOSIS — K21.9: ICD-10-CM

## 2021-07-11 DIAGNOSIS — R26.2: ICD-10-CM

## 2021-07-11 LAB
ALBUMIN SERPL-MCNC: 3.3 G/DL (ref 3.4–5)
ALP SERPL-CCNC: 69 U/L (ref 45–117)
ALT SERPL-CCNC: 16 U/L (ref 13–61)
ANION GAP SERPL CALC-SCNC: 5 MMOL/L (ref 8–16)
APPEARANCE UR: CLEAR
AST SERPL-CCNC: 12 U/L (ref 15–37)
BACTERIA # UR AUTO: 89 /UL (ref 0–1359)
BASOPHILS # BLD: 0.9 % (ref 0–2)
BILIRUB SERPL-MCNC: 0.4 MG/DL (ref 0.2–1)
BILIRUB UR STRIP.AUTO-MCNC: NEGATIVE MG/DL
BUN SERPL-MCNC: 13.8 MG/DL (ref 7–18)
CALCIUM SERPL-MCNC: 8.8 MG/DL (ref 8.5–10.1)
CASTS URNS QL MICRO: 1 /UL (ref 0–3.1)
CHLORIDE SERPL-SCNC: 105 MMOL/L (ref 98–107)
CO2 SERPL-SCNC: 29 MMOL/L (ref 21–32)
COLOR UR: YELLOW
CREAT SERPL-MCNC: 0.9 MG/DL (ref 0.55–1.3)
DEPRECATED RDW RBC AUTO: 14.2 % (ref 11.6–15.6)
EOSINOPHIL # BLD: 8.9 % (ref 0–4.5)
EPITH CASTS URNS QL MICRO: >36 /UL (ref 0–25.1)
GLUCOSE SERPL-MCNC: 117 MG/DL (ref 74–106)
HCT VFR BLD CALC: 40.8 % (ref 32.4–45.2)
HGB BLD-MCNC: 13.4 GM/DL (ref 10.7–15.3)
KETONES UR QL STRIP: NEGATIVE
LEUKOCYTE ESTERASE UR QL STRIP.AUTO: NEGATIVE
LYMPHOCYTES # BLD: 19.4 % (ref 8–40)
MCH RBC QN AUTO: 28.5 PG (ref 25.7–33.7)
MCHC RBC AUTO-ENTMCNC: 33 G/DL (ref 32–36)
MCV RBC: 86.5 FL (ref 80–96)
MONOCYTES # BLD AUTO: 6 % (ref 3.8–10.2)
NEUTROPHILS # BLD: 64.8 % (ref 42.8–82.8)
NITRITE UR QL STRIP: NEGATIVE
PH UR: 6.5 [PH] (ref 5–8)
PLATELET # BLD AUTO: 221 10^3/UL (ref 134–434)
PMV BLD: 7.9 FL (ref 7.5–11.1)
PROT SERPL-MCNC: 6.6 G/DL (ref 6.4–8.2)
PROT UR QL STRIP: NEGATIVE
PROT UR QL STRIP: NEGATIVE
RBC # BLD AUTO: 14 /UL (ref 0–23.9)
RBC # BLD AUTO: 4.71 M/MM3 (ref 3.6–5.2)
SODIUM SERPL-SCNC: 139 MMOL/L (ref 136–145)
SP GR UR: 1.01 (ref 1.01–1.03)
UROBILINOGEN UR STRIP-MCNC: 0.2 MG/DL (ref 0.2–1)
WBC # BLD AUTO: 4.8 K/MM3 (ref 4–10)
WBC # UR AUTO: 41 /UL (ref 0–25.8)

## 2021-07-11 PROCEDURE — C9803 HOPD COVID-19 SPEC COLLECT: HCPCS

## 2021-07-11 PROCEDURE — U0003 INFECTIOUS AGENT DETECTION BY NUCLEIC ACID (DNA OR RNA); SEVERE ACUTE RESPIRATORY SYNDROME CORONAVIRUS 2 (SARS-COV-2) (CORONAVIRUS DISEASE [COVID-19]), AMPLIFIED PROBE TECHNIQUE, MAKING USE OF HIGH THROUGHPUT TECHNOLOGIES AS DESCRIBED BY CMS-2020-01-R: HCPCS

## 2021-07-11 PROCEDURE — U0005 INFEC AGEN DETEC AMPLI PROBE: HCPCS

## 2021-07-11 RX ADMIN — LISINOPRIL SCH MG: 10 TABLET ORAL at 21:55

## 2021-07-11 RX ADMIN — ATORVASTATIN CALCIUM SCH MG: 10 TABLET, FILM COATED ORAL at 21:55

## 2021-07-12 RX ADMIN — POLYETHYLENE GLYCOL 3350 SCH GM: 17 POWDER, FOR SOLUTION ORAL at 09:46

## 2021-07-12 RX ADMIN — ATORVASTATIN CALCIUM SCH MG: 10 TABLET, FILM COATED ORAL at 21:28

## 2021-07-12 RX ADMIN — LISINOPRIL SCH MG: 10 TABLET ORAL at 09:46

## 2021-07-12 RX ADMIN — ENOXAPARIN SODIUM SCH MG: 40 INJECTION SUBCUTANEOUS at 09:46

## 2021-07-12 RX ADMIN — CYANOCOBALAMIN TAB 1000 MCG SCH MCG: 1000 TAB at 09:46

## 2021-07-12 RX ADMIN — PANTOPRAZOLE SODIUM SCH MG: 20 TABLET, DELAYED RELEASE ORAL at 09:46

## 2021-07-12 RX ADMIN — ATENOLOL SCH MG: 50 TABLET ORAL at 09:46

## 2021-07-12 RX ADMIN — LISINOPRIL SCH MG: 10 TABLET ORAL at 21:28

## 2021-07-12 RX ADMIN — AMITRIPTYLINE HYDROCHLORIDE SCH MG: 25 TABLET, FILM COATED ORAL at 21:28

## 2021-07-12 RX ADMIN — VITAMIN D, TAB 1000IU (100/BT) SCH UNIT: 25 TAB at 09:46

## 2021-07-12 RX ADMIN — MECLIZINE HYDROCHLORIDE SCH MG: 25 TABLET ORAL at 09:46

## 2021-07-13 RX ADMIN — ATORVASTATIN CALCIUM SCH MG: 10 TABLET, FILM COATED ORAL at 21:04

## 2021-07-13 RX ADMIN — PANTOPRAZOLE SODIUM SCH MG: 20 TABLET, DELAYED RELEASE ORAL at 10:09

## 2021-07-13 RX ADMIN — CYANOCOBALAMIN TAB 1000 MCG SCH MCG: 1000 TAB at 10:09

## 2021-07-13 RX ADMIN — VITAMIN D, TAB 1000IU (100/BT) SCH UNIT: 25 TAB at 10:09

## 2021-07-13 RX ADMIN — AMITRIPTYLINE HYDROCHLORIDE SCH MG: 25 TABLET, FILM COATED ORAL at 21:04

## 2021-07-13 RX ADMIN — MECLIZINE HYDROCHLORIDE SCH MG: 25 TABLET ORAL at 10:08

## 2021-07-13 RX ADMIN — LISINOPRIL SCH MG: 10 TABLET ORAL at 10:09

## 2021-07-13 RX ADMIN — ATENOLOL SCH MG: 50 TABLET ORAL at 10:09

## 2021-07-13 RX ADMIN — POLYETHYLENE GLYCOL 3350 SCH GM: 17 POWDER, FOR SOLUTION ORAL at 10:08

## 2021-07-13 RX ADMIN — ENOXAPARIN SODIUM SCH MG: 40 INJECTION SUBCUTANEOUS at 10:10

## 2021-07-13 RX ADMIN — LISINOPRIL SCH MG: 10 TABLET ORAL at 21:04

## 2021-07-14 RX ADMIN — MECLIZINE HYDROCHLORIDE SCH MG: 25 TABLET ORAL at 09:27

## 2021-07-14 RX ADMIN — PANTOPRAZOLE SODIUM SCH MG: 20 TABLET, DELAYED RELEASE ORAL at 09:27

## 2021-07-14 RX ADMIN — LISINOPRIL SCH MG: 10 TABLET ORAL at 21:11

## 2021-07-14 RX ADMIN — POLYETHYLENE GLYCOL 3350 SCH: 17 POWDER, FOR SOLUTION ORAL at 09:28

## 2021-07-14 RX ADMIN — AMITRIPTYLINE HYDROCHLORIDE SCH MG: 25 TABLET, FILM COATED ORAL at 21:11

## 2021-07-14 RX ADMIN — ENOXAPARIN SODIUM SCH MG: 40 INJECTION SUBCUTANEOUS at 09:27

## 2021-07-14 RX ADMIN — ATORVASTATIN CALCIUM SCH MG: 10 TABLET, FILM COATED ORAL at 21:11

## 2021-07-14 RX ADMIN — VITAMIN D, TAB 1000IU (100/BT) SCH UNIT: 25 TAB at 09:26

## 2021-07-14 RX ADMIN — CYANOCOBALAMIN TAB 1000 MCG SCH MCG: 1000 TAB at 09:27

## 2021-07-14 RX ADMIN — LISINOPRIL SCH MG: 10 TABLET ORAL at 09:27

## 2021-07-14 RX ADMIN — ATENOLOL SCH MG: 50 TABLET ORAL at 09:26

## 2021-07-15 RX ADMIN — MECLIZINE HYDROCHLORIDE SCH MG: 25 TABLET ORAL at 09:28

## 2021-07-15 RX ADMIN — AMOXICILLIN AND CLAVULANATE POTASSIUM SCH TAB: 875; 125 TABLET, FILM COATED ORAL at 18:10

## 2021-07-15 RX ADMIN — ENOXAPARIN SODIUM SCH MG: 40 INJECTION SUBCUTANEOUS at 09:28

## 2021-07-15 RX ADMIN — AMOXICILLIN AND CLAVULANATE POTASSIUM SCH TAB: 875; 125 TABLET, FILM COATED ORAL at 11:23

## 2021-07-15 RX ADMIN — ATORVASTATIN CALCIUM SCH MG: 10 TABLET, FILM COATED ORAL at 22:46

## 2021-07-15 RX ADMIN — PANTOPRAZOLE SODIUM SCH MG: 20 TABLET, DELAYED RELEASE ORAL at 09:28

## 2021-07-15 RX ADMIN — LISINOPRIL SCH MG: 10 TABLET ORAL at 09:28

## 2021-07-15 RX ADMIN — POLYETHYLENE GLYCOL 3350 SCH GM: 17 POWDER, FOR SOLUTION ORAL at 09:28

## 2021-07-15 RX ADMIN — CYANOCOBALAMIN TAB 1000 MCG SCH MCG: 1000 TAB at 09:30

## 2021-07-15 RX ADMIN — LISINOPRIL SCH MG: 10 TABLET ORAL at 22:46

## 2021-07-15 RX ADMIN — VITAMIN D, TAB 1000IU (100/BT) SCH UNIT: 25 TAB at 09:28

## 2021-07-15 RX ADMIN — AMITRIPTYLINE HYDROCHLORIDE SCH MG: 25 TABLET, FILM COATED ORAL at 22:45

## 2021-07-15 RX ADMIN — ATENOLOL SCH MG: 50 TABLET ORAL at 09:28

## 2021-07-16 VITALS — DIASTOLIC BLOOD PRESSURE: 74 MMHG | SYSTOLIC BLOOD PRESSURE: 133 MMHG | TEMPERATURE: 98.3 F | HEART RATE: 88 BPM

## 2021-07-16 RX ADMIN — ATENOLOL SCH MG: 50 TABLET ORAL at 10:20

## 2021-07-16 RX ADMIN — AMOXICILLIN AND CLAVULANATE POTASSIUM SCH TAB: 875; 125 TABLET, FILM COATED ORAL at 10:20

## 2021-07-16 RX ADMIN — MECLIZINE HYDROCHLORIDE SCH MG: 25 TABLET ORAL at 10:20

## 2021-07-16 RX ADMIN — LISINOPRIL SCH MG: 10 TABLET ORAL at 10:20

## 2021-07-16 RX ADMIN — ENOXAPARIN SODIUM SCH MG: 40 INJECTION SUBCUTANEOUS at 10:19

## 2021-07-16 RX ADMIN — AMOXICILLIN AND CLAVULANATE POTASSIUM SCH TAB: 875; 125 TABLET, FILM COATED ORAL at 17:04

## 2021-07-16 RX ADMIN — VITAMIN D, TAB 1000IU (100/BT) SCH UNIT: 25 TAB at 10:20

## 2021-07-16 RX ADMIN — CYANOCOBALAMIN TAB 1000 MCG SCH MCG: 1000 TAB at 10:20

## 2021-07-16 RX ADMIN — POLYETHYLENE GLYCOL 3350 SCH GM: 17 POWDER, FOR SOLUTION ORAL at 10:19

## 2021-07-16 RX ADMIN — PANTOPRAZOLE SODIUM SCH MG: 20 TABLET, DELAYED RELEASE ORAL at 10:20

## 2021-09-18 ENCOUNTER — HOSPITAL ENCOUNTER (INPATIENT)
Dept: HOSPITAL 74 - JER | Age: 86
LOS: 4 days | Discharge: NURSING, REHAB OR LONG-TERM CARE | DRG: 690 | End: 2021-09-22
Attending: INTERNAL MEDICINE | Admitting: INTERNAL MEDICINE
Payer: COMMERCIAL

## 2021-09-18 VITALS — BODY MASS INDEX: 28 KG/M2

## 2021-09-18 DIAGNOSIS — N39.0: Primary | ICD-10-CM

## 2021-09-18 DIAGNOSIS — R26.2: ICD-10-CM

## 2021-09-18 DIAGNOSIS — I10: ICD-10-CM

## 2021-09-18 DIAGNOSIS — Z85.038: ICD-10-CM

## 2021-09-18 DIAGNOSIS — R42: ICD-10-CM

## 2021-09-18 DIAGNOSIS — E78.5: ICD-10-CM

## 2021-09-18 LAB
ALBUMIN SERPL-MCNC: 3.3 G/DL (ref 3.4–5)
ALP SERPL-CCNC: 65 U/L (ref 45–117)
ALT SERPL-CCNC: 23 U/L (ref 13–61)
ANION GAP SERPL CALC-SCNC: 6 MMOL/L (ref 8–16)
APPEARANCE UR: (no result)
AST SERPL-CCNC: 11 U/L (ref 15–37)
BACTERIA # UR AUTO: 1237.6 /UL (ref 0–1359)
BASOPHILS # BLD: 0.9 % (ref 0–2)
BILIRUB SERPL-MCNC: 0.4 MG/DL (ref 0.2–1)
BILIRUB UR STRIP.AUTO-MCNC: NEGATIVE MG/DL
BUN SERPL-MCNC: 20.6 MG/DL (ref 7–18)
CALCIUM SERPL-MCNC: 9.5 MG/DL (ref 8.5–10.1)
CASTS URNS QL MICRO: 2.03 /UL (ref 0–3.1)
CHLORIDE SERPL-SCNC: 99 MMOL/L (ref 98–107)
CO2 SERPL-SCNC: 27 MMOL/L (ref 21–32)
COLOR UR: YELLOW
CREAT SERPL-MCNC: 1.1 MG/DL (ref 0.55–1.3)
DEPRECATED RDW RBC AUTO: 15.1 % (ref 11.6–15.6)
EOSINOPHIL # BLD: 9.3 % (ref 0–4.5)
EPITH CASTS URNS QL MICRO: 152.2 /UL (ref 0–25.1)
GLUCOSE SERPL-MCNC: 104 MG/DL (ref 74–106)
HCT VFR BLD CALC: 36.6 % (ref 32.4–45.2)
HGB BLD-MCNC: 12.5 GM/DL (ref 10.7–15.3)
KETONES UR QL STRIP: NEGATIVE
LEUKOCYTE ESTERASE UR QL STRIP.AUTO: (no result)
LYMPHOCYTES # BLD: 23.9 % (ref 8–40)
MCH RBC QN AUTO: 29.1 PG (ref 25.7–33.7)
MCHC RBC AUTO-ENTMCNC: 34.3 G/DL (ref 32–36)
MCV RBC: 84.8 FL (ref 80–96)
MONOCYTES # BLD AUTO: 5.9 % (ref 3.8–10.2)
NEUTROPHILS # BLD: 60 % (ref 42.8–82.8)
NITRITE UR QL STRIP: NEGATIVE
PH UR: 8 [PH] (ref 5–8)
PLATELET # BLD AUTO: 237 10^3/UL (ref 134–434)
PMV BLD: 7.6 FL (ref 7.5–11.1)
PROT SERPL-MCNC: 6.4 G/DL (ref 6.4–8.2)
PROT UR QL STRIP: NEGATIVE
PROT UR QL STRIP: NEGATIVE
RBC # BLD AUTO: 4.31 M/MM3 (ref 3.6–5.2)
RBC # BLD AUTO: 81 /UL (ref 0–23.9)
SODIUM SERPL-SCNC: 132 MMOL/L (ref 136–145)
SP GR UR: 1.01 (ref 1.01–1.03)
UROBILINOGEN UR STRIP-MCNC: 0.2 MG/DL (ref 0.2–1)
WBC # BLD AUTO: 5.7 K/MM3 (ref 4–10)
WBC # UR AUTO: 140.7 /UL (ref 0–25.8)

## 2021-09-18 PROCEDURE — C9803 HOPD COVID-19 SPEC COLLECT: HCPCS

## 2021-09-18 PROCEDURE — U0003 INFECTIOUS AGENT DETECTION BY NUCLEIC ACID (DNA OR RNA); SEVERE ACUTE RESPIRATORY SYNDROME CORONAVIRUS 2 (SARS-COV-2) (CORONAVIRUS DISEASE [COVID-19]), AMPLIFIED PROBE TECHNIQUE, MAKING USE OF HIGH THROUGHPUT TECHNOLOGIES AS DESCRIBED BY CMS-2020-01-R: HCPCS

## 2021-09-18 PROCEDURE — U0005 INFEC AGEN DETEC AMPLI PROBE: HCPCS

## 2021-09-18 RX ADMIN — ATORVASTATIN CALCIUM SCH MG: 10 TABLET, FILM COATED ORAL at 22:31

## 2021-09-18 RX ADMIN — LISINOPRIL SCH MG: 10 TABLET ORAL at 22:32

## 2021-09-19 RX ADMIN — ATORVASTATIN CALCIUM SCH MG: 10 TABLET, FILM COATED ORAL at 21:27

## 2021-09-19 RX ADMIN — PANTOPRAZOLE SODIUM SCH MG: 20 TABLET, DELAYED RELEASE ORAL at 11:48

## 2021-09-19 RX ADMIN — ATENOLOL SCH MG: 50 TABLET ORAL at 11:47

## 2021-09-19 RX ADMIN — LISINOPRIL SCH MG: 10 TABLET ORAL at 11:48

## 2021-09-19 RX ADMIN — LISINOPRIL SCH MG: 10 TABLET ORAL at 21:27

## 2021-09-19 RX ADMIN — CEFTRIAXONE SCH MLS/HR: 1 INJECTION, POWDER, FOR SOLUTION INTRAMUSCULAR; INTRAVENOUS at 11:47

## 2021-09-20 RX ADMIN — PANTOPRAZOLE SODIUM SCH MG: 20 TABLET, DELAYED RELEASE ORAL at 09:14

## 2021-09-20 RX ADMIN — ATORVASTATIN CALCIUM SCH MG: 10 TABLET, FILM COATED ORAL at 21:32

## 2021-09-20 RX ADMIN — ATENOLOL SCH MG: 50 TABLET ORAL at 09:14

## 2021-09-20 RX ADMIN — ENOXAPARIN SODIUM SCH MG: 40 INJECTION SUBCUTANEOUS at 12:59

## 2021-09-20 RX ADMIN — CEFTRIAXONE SCH MLS/HR: 1 INJECTION, POWDER, FOR SOLUTION INTRAMUSCULAR; INTRAVENOUS at 09:14

## 2021-09-20 RX ADMIN — Medication PRN MG: at 21:32

## 2021-09-20 RX ADMIN — LISINOPRIL SCH MG: 10 TABLET ORAL at 09:14

## 2021-09-20 RX ADMIN — LISINOPRIL SCH MG: 10 TABLET ORAL at 21:32

## 2021-09-21 LAB
ALBUMIN SERPL-MCNC: 2.8 G/DL (ref 3.4–5)
ALP SERPL-CCNC: 68 U/L (ref 45–117)
ALT SERPL-CCNC: 16 U/L (ref 13–61)
ANION GAP SERPL CALC-SCNC: 4 MMOL/L (ref 8–16)
AST SERPL-CCNC: 10 U/L (ref 15–37)
BASOPHILS # BLD: 0.8 % (ref 0–2)
BILIRUB SERPL-MCNC: 0.3 MG/DL (ref 0.2–1)
BUN SERPL-MCNC: 14.9 MG/DL (ref 7–18)
CALCIUM SERPL-MCNC: 9 MG/DL (ref 8.5–10.1)
CHLORIDE SERPL-SCNC: 100 MMOL/L (ref 98–107)
CO2 SERPL-SCNC: 29 MMOL/L (ref 21–32)
CREAT SERPL-MCNC: 0.9 MG/DL (ref 0.55–1.3)
DEPRECATED RDW RBC AUTO: 15 % (ref 11.6–15.6)
EOSINOPHIL # BLD: 8.4 % (ref 0–4.5)
GLUCOSE SERPL-MCNC: 101 MG/DL (ref 74–106)
HCT VFR BLD CALC: 35.8 % (ref 32.4–45.2)
HGB BLD-MCNC: 12.3 GM/DL (ref 10.7–15.3)
LYMPHOCYTES # BLD: 27.3 % (ref 8–40)
MCH RBC QN AUTO: 29.2 PG (ref 25.7–33.7)
MCHC RBC AUTO-ENTMCNC: 34.3 G/DL (ref 32–36)
MCV RBC: 85.1 FL (ref 80–96)
MONOCYTES # BLD AUTO: 6.4 % (ref 3.8–10.2)
NEUTROPHILS # BLD: 57.1 % (ref 42.8–82.8)
PLATELET # BLD AUTO: 225 10^3/UL (ref 134–434)
PMV BLD: 7.9 FL (ref 7.5–11.1)
PROT SERPL-MCNC: 5.7 G/DL (ref 6.4–8.2)
RBC # BLD AUTO: 4.2 M/MM3 (ref 3.6–5.2)
SODIUM SERPL-SCNC: 133 MMOL/L (ref 136–145)
WBC # BLD AUTO: 6 K/MM3 (ref 4–10)

## 2021-09-21 RX ADMIN — LISINOPRIL SCH MG: 10 TABLET ORAL at 21:34

## 2021-09-21 RX ADMIN — CEFTRIAXONE SCH MLS/HR: 1 INJECTION, POWDER, FOR SOLUTION INTRAMUSCULAR; INTRAVENOUS at 09:35

## 2021-09-21 RX ADMIN — ENOXAPARIN SODIUM SCH MG: 40 INJECTION SUBCUTANEOUS at 09:35

## 2021-09-21 RX ADMIN — ATORVASTATIN CALCIUM SCH MG: 10 TABLET, FILM COATED ORAL at 21:35

## 2021-09-21 RX ADMIN — LISINOPRIL SCH MG: 10 TABLET ORAL at 09:35

## 2021-09-21 RX ADMIN — Medication PRN MG: at 21:34

## 2021-09-21 RX ADMIN — ATENOLOL SCH MG: 50 TABLET ORAL at 09:35

## 2021-09-21 RX ADMIN — PANTOPRAZOLE SODIUM SCH MG: 20 TABLET, DELAYED RELEASE ORAL at 09:35

## 2021-09-22 VITALS — HEART RATE: 72 BPM | TEMPERATURE: 98.2 F | SYSTOLIC BLOOD PRESSURE: 112 MMHG | DIASTOLIC BLOOD PRESSURE: 65 MMHG

## 2021-09-22 RX ADMIN — ATENOLOL SCH MG: 50 TABLET ORAL at 09:18

## 2021-09-22 RX ADMIN — LISINOPRIL SCH MG: 10 TABLET ORAL at 09:18

## 2021-09-22 RX ADMIN — CEFTRIAXONE SCH MLS/HR: 1 INJECTION, POWDER, FOR SOLUTION INTRAMUSCULAR; INTRAVENOUS at 09:18

## 2021-09-22 RX ADMIN — ENOXAPARIN SODIUM SCH MG: 40 INJECTION SUBCUTANEOUS at 09:18

## 2021-09-22 RX ADMIN — PANTOPRAZOLE SODIUM SCH MG: 20 TABLET, DELAYED RELEASE ORAL at 09:18
